# Patient Record
Sex: MALE | Race: WHITE | NOT HISPANIC OR LATINO | Employment: OTHER | URBAN - METROPOLITAN AREA
[De-identification: names, ages, dates, MRNs, and addresses within clinical notes are randomized per-mention and may not be internally consistent; named-entity substitution may affect disease eponyms.]

---

## 2023-05-30 DIAGNOSIS — F41.1 ANXIETY, GENERALIZED: Primary | ICD-10-CM

## 2023-05-30 PROBLEM — R09.81 NASAL CONGESTION: Status: ACTIVE | Noted: 2023-05-30

## 2023-05-30 PROBLEM — R09.89 CHEST CONGESTION: Status: ACTIVE | Noted: 2023-05-30

## 2023-05-30 PROBLEM — J34.2 NASAL SEPTAL DEVIATION: Status: ACTIVE | Noted: 2023-05-30

## 2023-05-30 PROBLEM — J18.0 BRONCHOPNEUMONIA: Status: ACTIVE | Noted: 2023-05-30

## 2023-05-30 PROBLEM — E03.9 ADULT HYPOTHYROIDISM: Status: ACTIVE | Noted: 2023-05-30

## 2023-05-30 PROBLEM — H65.491 CHRONIC OTITIS MEDIA OF RIGHT EAR WITH EFFUSION: Status: ACTIVE | Noted: 2023-05-30

## 2023-05-30 PROBLEM — E11.9 TYPE 2 DIABETES MELLITUS (MULTI): Status: ACTIVE | Noted: 2023-05-30

## 2023-05-30 PROBLEM — R73.09 ABNORMAL GLUCOSE: Status: ACTIVE | Noted: 2023-05-30

## 2023-05-30 PROBLEM — J34.89 NASAL DRAINAGE: Status: ACTIVE | Noted: 2023-05-30

## 2023-05-30 PROBLEM — K86.1 CHRONIC PANCREATITIS (MULTI): Status: ACTIVE | Noted: 2023-05-30

## 2023-05-30 PROBLEM — R06.2 WHEEZE: Status: ACTIVE | Noted: 2023-05-30

## 2023-05-30 PROBLEM — R53.83 FATIGUE: Status: ACTIVE | Noted: 2023-05-30

## 2023-05-30 PROBLEM — K86.89 PANCREATIC MASS (HHS-HCC): Status: ACTIVE | Noted: 2023-05-30

## 2023-05-30 PROBLEM — H90.A22 SENSORINEURAL HEARING LOSS (SNHL) OF LEFT EAR WITH RESTRICTED HEARING OF RIGHT EAR: Status: ACTIVE | Noted: 2023-05-30

## 2023-05-30 PROBLEM — H90.A31 MIXED CONDUCTIVE AND SENSORINEURAL HEARING LOSS, UNILATERAL, RIGHT EAR WITH RESTRICTED HEARING ON THE CONTRALATERAL SIDE: Status: ACTIVE | Noted: 2023-05-30

## 2023-05-30 PROBLEM — E55.9 VITAMIN D DEFICIENCY: Status: ACTIVE | Noted: 2023-05-30

## 2023-05-30 PROBLEM — R05.3 CHRONIC COUGH: Status: ACTIVE | Noted: 2023-05-30

## 2023-05-30 PROBLEM — H26.9 CATARACT: Status: ACTIVE | Noted: 2023-05-30

## 2023-05-30 PROBLEM — E53.8 VITAMIN B 12 DEFICIENCY: Status: ACTIVE | Noted: 2023-05-30

## 2023-05-30 PROBLEM — R10.9 ABDOMINAL PAIN: Status: ACTIVE | Noted: 2023-05-30

## 2023-05-30 PROBLEM — J34.3 HYPERTROPHY OF BOTH INFERIOR NASAL TURBINATES: Status: ACTIVE | Noted: 2023-05-30

## 2023-05-30 PROBLEM — H93.8X1 SENSATION OF PLUGGED EAR ON RIGHT SIDE: Status: ACTIVE | Noted: 2023-05-30

## 2023-05-30 PROBLEM — I25.10 CORONARY ARTERIOSCLEROSIS: Status: ACTIVE | Noted: 2023-05-30

## 2023-05-30 PROBLEM — C25.1: Status: ACTIVE | Noted: 2023-05-30

## 2023-05-30 PROBLEM — R53.82 CHRONIC FATIGUE: Status: ACTIVE | Noted: 2023-05-30

## 2023-05-30 PROBLEM — R91.8 PULMONARY NODULES: Status: ACTIVE | Noted: 2023-05-30

## 2023-05-30 PROBLEM — J31.0 CHRONIC RHINITIS: Status: ACTIVE | Noted: 2023-05-30

## 2023-05-30 PROBLEM — R94.128 ABNORMAL TYMPANOGRAM: Status: ACTIVE | Noted: 2023-05-30

## 2023-05-30 PROBLEM — E78.00 HYPERCHOLESTEROLEMIA: Status: ACTIVE | Noted: 2023-05-30

## 2023-05-30 PROBLEM — I10 BENIGN HYPERTENSION: Status: ACTIVE | Noted: 2023-05-30

## 2023-05-30 PROBLEM — R09.81 SINUS CONGESTION: Status: ACTIVE | Noted: 2023-05-30

## 2023-05-30 PROBLEM — B37.0 CANDIDIASIS OF MOUTH: Status: ACTIVE | Noted: 2023-05-30

## 2023-05-30 PROBLEM — K21.9 GERD (GASTROESOPHAGEAL REFLUX DISEASE): Status: ACTIVE | Noted: 2023-05-30

## 2023-05-30 PROBLEM — J20.9 BRONCHOSPASM WITH BRONCHITIS, ACUTE: Status: ACTIVE | Noted: 2023-05-30

## 2023-05-30 PROBLEM — R07.9 CHEST PAIN: Status: ACTIVE | Noted: 2023-05-30

## 2023-05-30 PROBLEM — R91.8 PULMONARY INFILTRATES ON CXR: Status: ACTIVE | Noted: 2023-05-30

## 2023-05-30 PROBLEM — K29.00 ACUTE GASTRITIS: Status: ACTIVE | Noted: 2023-05-30

## 2023-05-30 PROBLEM — H65.91 FLUID LEVEL BEHIND TYMPANIC MEMBRANE OF RIGHT EAR: Status: ACTIVE | Noted: 2023-05-30

## 2023-05-30 RX ORDER — BLOOD-GLUCOSE METER
EACH MISCELLANEOUS
COMMUNITY
Start: 2022-10-11

## 2023-05-30 RX ORDER — PREGABALIN 150 MG/1
150 CAPSULE ORAL
COMMUNITY
Start: 2022-08-03 | End: 2023-10-16 | Stop reason: ALTCHOICE

## 2023-05-30 RX ORDER — NITROGLYCERIN 0.4 MG/1
TABLET SUBLINGUAL
COMMUNITY
Start: 2022-06-06

## 2023-05-30 RX ORDER — METHYLPREDNISOLONE 4 MG/1
TABLET ORAL
COMMUNITY
Start: 2022-07-31 | End: 2023-10-09

## 2023-05-30 RX ORDER — OMEGA-3-ACID ETHYL ESTERS 1 G/1
1000 CAPSULE, LIQUID FILLED ORAL 2 TIMES DAILY
COMMUNITY
Start: 2022-10-11

## 2023-05-30 RX ORDER — ERGOCALCIFEROL 1.25 MG/1
50000 CAPSULE ORAL
COMMUNITY
Start: 2022-10-11

## 2023-05-30 RX ORDER — DULOXETIN HYDROCHLORIDE 30 MG/1
CAPSULE, DELAYED RELEASE ORAL
Qty: 90 CAPSULE | Refills: 3 | Status: SHIPPED | OUTPATIENT
Start: 2023-05-30 | End: 2023-10-16 | Stop reason: ALTCHOICE

## 2023-05-30 RX ORDER — EZETIMIBE 10 MG/1
10 TABLET ORAL DAILY
COMMUNITY
Start: 2019-07-02

## 2023-05-30 RX ORDER — LANCETS
EACH MISCELLANEOUS
COMMUNITY
Start: 2023-01-10

## 2023-05-30 RX ORDER — DEXTROMETHORPHAN HYDROBROMIDE, GUAIFENESIN 30; 600 MG/1; MG/1
1 TABLET, EXTENDED RELEASE ORAL EVERY 12 HOURS
COMMUNITY
Start: 2022-08-23 | End: 2023-10-09

## 2023-05-30 RX ORDER — LOSARTAN POTASSIUM 25 MG/1
25 TABLET ORAL DAILY
COMMUNITY
Start: 2020-04-13

## 2023-05-30 RX ORDER — BLOOD SUGAR DIAGNOSTIC
STRIP MISCELLANEOUS
COMMUNITY
Start: 2022-11-14

## 2023-05-30 RX ORDER — FLUTICASONE PROPIONATE AND SALMETEROL 250; 50 UG/1; UG/1
1 POWDER RESPIRATORY (INHALATION) 2 TIMES DAILY
COMMUNITY
Start: 2022-09-29 | End: 2023-10-09

## 2023-05-30 RX ORDER — LANCETS
EACH MISCELLANEOUS
COMMUNITY
Start: 2023-01-10 | End: 2023-10-16 | Stop reason: ALTCHOICE

## 2023-05-30 RX ORDER — METHOCARBAMOL 500 MG/1
TABLET, FILM COATED ORAL
COMMUNITY
Start: 2022-07-31 | End: 2023-10-09

## 2023-05-30 RX ORDER — OXYCODONE AND ACETAMINOPHEN 5; 325 MG/1; MG/1
1 TABLET ORAL DAILY PRN
COMMUNITY

## 2023-05-30 RX ORDER — ATORVASTATIN CALCIUM 80 MG/1
80 TABLET, FILM COATED ORAL
COMMUNITY
Start: 2020-06-22 | End: 2023-10-24

## 2023-05-30 RX ORDER — MOXIFLOXACIN 5 MG/ML
SOLUTION/ DROPS OPHTHALMIC 4 TIMES DAILY
COMMUNITY
End: 2023-10-09

## 2023-05-30 RX ORDER — LEVOTHYROXINE SODIUM 125 UG/1
1 TABLET ORAL DAILY
COMMUNITY
Start: 2017-12-09 | End: 2023-10-09

## 2023-05-30 RX ORDER — INHALER, ASSIST DEVICES
SPACER (EA) MISCELLANEOUS
COMMUNITY
Start: 2022-08-09 | End: 2023-10-09

## 2023-05-30 RX ORDER — IBUPROFEN 200 MG
1 TABLET ORAL DAILY
COMMUNITY
Start: 2023-03-02 | End: 2023-10-09

## 2023-05-30 RX ORDER — GABAPENTIN 100 MG/1
100 CAPSULE ORAL 3 TIMES DAILY
COMMUNITY
Start: 2022-06-07 | End: 2023-10-09

## 2023-05-30 RX ORDER — PREDNISOLONE ACETATE 10 MG/ML
SUSPENSION/ DROPS OPHTHALMIC 4 TIMES DAILY
COMMUNITY
End: 2023-10-09

## 2023-05-30 RX ORDER — BENZONATATE 100 MG/1
1 CAPSULE ORAL EVERY 8 HOURS PRN
COMMUNITY
Start: 2022-08-09 | End: 2023-10-09

## 2023-05-30 RX ORDER — AMOXICILLIN 500 MG/1
CAPSULE ORAL
COMMUNITY
Start: 2023-05-02 | End: 2023-10-09

## 2023-05-30 RX ORDER — LEVOFLOXACIN 500 MG/1
TABLET, FILM COATED ORAL
COMMUNITY
Start: 2022-08-16 | End: 2023-10-09

## 2023-05-30 RX ORDER — INSULIN GLARGINE 100 [IU]/ML
26 INJECTION, SOLUTION SUBCUTANEOUS EVERY MORNING
COMMUNITY

## 2023-05-30 RX ORDER — AMOXICILLIN AND CLAVULANATE POTASSIUM 875; 125 MG/1; MG/1
1 TABLET, FILM COATED ORAL EVERY 12 HOURS
COMMUNITY
Start: 2022-08-04 | End: 2023-10-09

## 2023-05-30 RX ORDER — DULOXETIN HYDROCHLORIDE 30 MG/1
30 CAPSULE, DELAYED RELEASE ORAL DAILY
COMMUNITY
Start: 2016-10-25 | End: 2024-06-08

## 2023-05-30 RX ORDER — ALBUTEROL SULFATE 90 UG/1
AEROSOL, METERED RESPIRATORY (INHALATION)
COMMUNITY
Start: 2022-08-09 | End: 2023-10-09

## 2023-05-30 RX ORDER — AZITHROMYCIN 250 MG/1
TABLET, FILM COATED ORAL
COMMUNITY
Start: 2022-08-09 | End: 2023-10-09

## 2023-05-30 RX ORDER — NICOTINE 10 MG/ML
SPRAY, METERED NASAL
COMMUNITY
Start: 2023-03-02 | End: 2023-10-09

## 2023-05-30 RX ORDER — ATENOLOL 25 MG/1
25 TABLET ORAL
COMMUNITY
Start: 2019-07-02

## 2023-05-30 RX ORDER — CIPROFLOXACIN HYDROCHLORIDE 3 MG/ML
SOLUTION/ DROPS OPHTHALMIC
COMMUNITY
Start: 2023-04-14 | End: 2023-10-16 | Stop reason: ALTCHOICE

## 2023-05-30 RX ORDER — ASPIRIN 81 MG/1
1 TABLET ORAL DAILY
COMMUNITY

## 2023-05-30 RX ORDER — CLOTRIMAZOLE 10 MG/1
LOZENGE ORAL; TOPICAL
COMMUNITY
Start: 2022-08-19 | End: 2023-10-09

## 2023-05-30 RX ORDER — PEN NEEDLE, DIABETIC 31 GX5/16"
NEEDLE, DISPOSABLE MISCELLANEOUS
COMMUNITY
Start: 2023-01-09

## 2023-05-30 RX ORDER — OMEPRAZOLE 20 MG/1
20 CAPSULE, DELAYED RELEASE ORAL DAILY
COMMUNITY
Start: 2023-01-24 | End: 2023-10-24

## 2023-05-30 RX ORDER — PREDNISONE 20 MG/1
TABLET ORAL
COMMUNITY
Start: 2022-09-29 | End: 2023-10-09

## 2023-05-30 RX ORDER — FLUTICASONE PROPIONATE 50 MCG
2 SPRAY, SUSPENSION (ML) NASAL 2 TIMES DAILY
COMMUNITY
End: 2023-10-09

## 2023-08-18 PROBLEM — Z72.0 TOBACCO ABUSE: Status: ACTIVE | Noted: 2023-08-18

## 2023-10-02 ENCOUNTER — HOSPITAL ENCOUNTER (OUTPATIENT)
Dept: RADIOLOGY | Facility: CLINIC | Age: 63
Discharge: HOME | End: 2023-10-02
Payer: MEDICARE

## 2023-10-02 DIAGNOSIS — M25.511 PAIN IN RIGHT SHOULDER: ICD-10-CM

## 2023-10-02 PROCEDURE — 73200 CT UPPER EXTREMITY W/O DYE: CPT | Mod: RIGHT SIDE | Performed by: RADIOLOGY

## 2023-10-02 PROCEDURE — 73200 CT UPPER EXTREMITY W/O DYE: CPT | Mod: RT

## 2023-10-09 DIAGNOSIS — E03.9 ADULT HYPOTHYROIDISM: Primary | ICD-10-CM

## 2023-10-09 PROBLEM — H65.91 FLUID LEVEL BEHIND TYMPANIC MEMBRANE OF RIGHT EAR: Status: RESOLVED | Noted: 2023-05-30 | Resolved: 2023-10-09

## 2023-10-09 PROBLEM — C25.1: Status: RESOLVED | Noted: 2023-05-30 | Resolved: 2023-10-09

## 2023-10-09 PROBLEM — M75.41 IMPINGEMENT SYNDROME OF RIGHT SHOULDER: Status: ACTIVE | Noted: 2023-05-28

## 2023-10-09 PROBLEM — B37.0 CANDIDIASIS OF MOUTH: Status: RESOLVED | Noted: 2023-05-30 | Resolved: 2023-10-09

## 2023-10-09 PROBLEM — M75.121 COMPLETE TEAR OF RIGHT ROTATOR CUFF: Status: ACTIVE | Noted: 2023-08-28

## 2023-10-09 PROBLEM — M47.9 SPONDYLOSIS: Status: ACTIVE | Noted: 2023-10-09

## 2023-10-09 PROBLEM — F41.1 ANXIETY, GENERALIZED: Status: RESOLVED | Noted: 2023-05-30 | Resolved: 2023-10-09

## 2023-10-09 PROBLEM — M17.12 ARTHRITIS OF LEFT KNEE: Status: ACTIVE | Noted: 2023-08-28

## 2023-10-09 PROBLEM — R94.128 ABNORMAL TYMPANOGRAM: Status: RESOLVED | Noted: 2023-05-30 | Resolved: 2023-10-09

## 2023-10-09 PROBLEM — M48.00 SPINAL STENOSIS: Status: ACTIVE | Noted: 2023-10-09

## 2023-10-09 PROBLEM — H26.9 CATARACT: Status: RESOLVED | Noted: 2023-05-30 | Resolved: 2023-10-09

## 2023-10-09 PROBLEM — R25.2 CRAMPING OF HANDS: Status: ACTIVE | Noted: 2023-05-28

## 2023-10-09 RX ORDER — LEVOTHYROXINE SODIUM 125 UG/1
125 TABLET ORAL DAILY
Qty: 90 TABLET | Refills: 3 | Status: SHIPPED | OUTPATIENT
Start: 2023-10-09

## 2023-10-09 RX ORDER — AMMONIUM LACTATE 12 G/100G
LOTION TOPICAL
COMMUNITY
Start: 2023-08-28

## 2023-10-09 RX ORDER — KETOCONAZOLE 20 MG/G
CREAM TOPICAL
COMMUNITY
Start: 2023-08-28

## 2023-10-09 RX ORDER — PANTOPRAZOLE SODIUM 40 MG/1
40 TABLET, DELAYED RELEASE ORAL
COMMUNITY
Start: 2021-09-22 | End: 2023-10-16 | Stop reason: ALTCHOICE

## 2023-10-10 ENCOUNTER — TELEPHONE (OUTPATIENT)
Dept: CARDIOLOGY | Facility: CLINIC | Age: 63
End: 2023-10-10
Payer: MEDICARE

## 2023-10-10 NOTE — TELEPHONE ENCOUNTER
Clearance request received by surgeon's office and Rober has to complete. Call pt and informed him Rober will complete and we will fax. Once this has been done we will let him know. Pt verbalizes understanding.

## 2023-10-16 ENCOUNTER — OFFICE VISIT (OUTPATIENT)
Dept: PRIMARY CARE | Facility: CLINIC | Age: 63
End: 2023-10-16
Payer: MEDICARE

## 2023-10-16 VITALS
DIASTOLIC BLOOD PRESSURE: 70 MMHG | BODY MASS INDEX: 32.17 KG/M2 | SYSTOLIC BLOOD PRESSURE: 120 MMHG | WEIGHT: 211.6 LBS | OXYGEN SATURATION: 98 % | HEART RATE: 53 BPM

## 2023-10-16 DIAGNOSIS — Z01.818 PREOPERATIVE CLEARANCE: Primary | ICD-10-CM

## 2023-10-16 DIAGNOSIS — E11.69 TYPE 2 DIABETES MELLITUS WITH OTHER SPECIFIED COMPLICATION, UNSPECIFIED WHETHER LONG TERM INSULIN USE (MULTI): ICD-10-CM

## 2023-10-16 PROCEDURE — 3074F SYST BP LT 130 MM HG: CPT | Performed by: STUDENT IN AN ORGANIZED HEALTH CARE EDUCATION/TRAINING PROGRAM

## 2023-10-16 PROCEDURE — 4010F ACE/ARB THERAPY RXD/TAKEN: CPT | Performed by: STUDENT IN AN ORGANIZED HEALTH CARE EDUCATION/TRAINING PROGRAM

## 2023-10-16 PROCEDURE — 3078F DIAST BP <80 MM HG: CPT | Performed by: STUDENT IN AN ORGANIZED HEALTH CARE EDUCATION/TRAINING PROGRAM

## 2023-10-16 PROCEDURE — 99213 OFFICE O/P EST LOW 20 MIN: CPT | Performed by: STUDENT IN AN ORGANIZED HEALTH CARE EDUCATION/TRAINING PROGRAM

## 2023-10-16 ASSESSMENT — ENCOUNTER SYMPTOMS: DEPRESSION: 0

## 2023-10-16 ASSESSMENT — PAIN SCALES - GENERAL: PAINLEVEL: 8

## 2023-10-16 NOTE — PROGRESS NOTES
Subjective   Patient ID: Harry Ochoa is a 63 y.o. male who presents for Surgery clearance (Right shoulder replacement.).    HPI comes in for surgical clearance    Review of Systems  Constitutional: NO F, chills, or sweats  Eyes: no blurred vision or visual disturbance  ENT: no hearing loss, no congestion, no nasal discharge, no hoarseness and no sore throat.   Cardiovascular: no chest pain, no edema, no palps and no syncope.   Respiratory: no cough,no s.o.b. and no wheezing  Gastrointestinal: no abdominal pain, No C/D no N/V, no blood in stools  Genitourinary: no dysuria, no change in urinary frequency, no urinary hesitancy and no feelings of urinary urgency.   Musculoskeletal: Chronic joint pains, chronic right shoulder pain  Integumentary: no new skin lesions and no rashes.   Neurological: no difficulty walking, no headache, no limb weakness, no numbness and no tingling.   Psychiatric: no anxiety, no depression, no anhedonia and no substance use disorders.   Endocrine: no recent weight gain and no recent weight loss.  Diabetes has been well controlled with endocrinology  Hematologic/Lymphatic: no tendency for easy bruising and no swollen glands.  Objective   /70 (BP Location: Right arm, Patient Position: Sitting, BP Cuff Size: Large adult)   Pulse 53   Wt 96 kg (211 lb 9.6 oz)   SpO2 98%   BMI 32.17 kg/m²     Physical Exam  gen- a & o x 3, nad, pleasant  heent- eomi, perrla, ear canals patent, TM's non-erythematous, no fluid, frontal and maxillary sinus's nontender  neck- supple, nontender, no palpable or enlarged nodes, no thyromegaly  heart- rrr, no murmurs  lungs- cta b/l , no w/r/r  chest- symmetric, nontender  ab- soft, nontender, no palpable organomegaly, postive bowel sounds  ex's- no c/c/e  neuro- CNs 2-12 grossly intact, full sensation and strength in all extremities    Assessment/Plan     #1.  Chronic right shoulder pain.  Pending orthopedic surgery.  Surgical clearance visit today.  Letter  written today.

## 2023-10-16 NOTE — LETTER
Attention orthopedics,    Harry Ochoa (1960) was seen today for preoperative clearance visit for right shoulder total replacement.  He has already been seen by his cardiologist and endocrinologist and received surgical clearance.  Today he has no signs of infectious disease.  He has had no prior complications to anesthesia with surgeries.  He is up-to-date with screenings.    At this time he is cleared for orthopedic right shoulder replacement without restriction.    Thank you for your cooperation.    Rinku Matamoros DO

## 2023-10-17 ENCOUNTER — TELEPHONE (OUTPATIENT)
Dept: CARDIOLOGY | Facility: CLINIC | Age: 63
End: 2023-10-17
Payer: MEDICARE

## 2023-10-17 NOTE — TELEPHONE ENCOUNTER
Received faxed cardiac clearance. Pt to have right reverse total shoulder surgery with Dr. Hernandez, not scheduled yet. Pt is taking a BASA.    Please advise of cardiac risk and how long to hold ASA.

## 2023-10-19 DIAGNOSIS — E78.00 HYPERCHOLESTEROLEMIA: Primary | ICD-10-CM

## 2023-10-24 DIAGNOSIS — K21.9 GASTROESOPHAGEAL REFLUX DISEASE, UNSPECIFIED WHETHER ESOPHAGITIS PRESENT: Primary | ICD-10-CM

## 2023-10-24 RX ORDER — OMEPRAZOLE 20 MG/1
20 CAPSULE, DELAYED RELEASE ORAL
Qty: 60 CAPSULE | Refills: 5 | Status: SHIPPED | OUTPATIENT
Start: 2023-10-24

## 2023-10-24 RX ORDER — ATORVASTATIN CALCIUM 80 MG/1
80 TABLET, FILM COATED ORAL DAILY
Qty: 90 TABLET | Refills: 3 | Status: SHIPPED | OUTPATIENT
Start: 2023-10-24

## 2023-10-27 ENCOUNTER — APPOINTMENT (OUTPATIENT)
Dept: OTOLARYNGOLOGY | Facility: CLINIC | Age: 63
End: 2023-10-27
Payer: MEDICARE

## 2023-10-27 ENCOUNTER — APPOINTMENT (OUTPATIENT)
Dept: AUDIOLOGY | Facility: CLINIC | Age: 63
End: 2023-10-27
Payer: MEDICARE

## 2024-03-22 ENCOUNTER — NURSE TRIAGE (OUTPATIENT)
Dept: PRIMARY CARE | Facility: CLINIC | Age: 64
End: 2024-03-22
Payer: MEDICARE

## 2024-03-29 ENCOUNTER — OFFICE VISIT (OUTPATIENT)
Dept: PRIMARY CARE | Facility: CLINIC | Age: 64
End: 2024-03-29
Payer: MEDICARE

## 2024-03-29 VITALS
DIASTOLIC BLOOD PRESSURE: 70 MMHG | HEART RATE: 65 BPM | OXYGEN SATURATION: 98 % | BODY MASS INDEX: 31.81 KG/M2 | WEIGHT: 209.2 LBS | SYSTOLIC BLOOD PRESSURE: 130 MMHG

## 2024-03-29 DIAGNOSIS — K86.1 CHRONIC PANCREATITIS, UNSPECIFIED PANCREATITIS TYPE (MULTI): ICD-10-CM

## 2024-03-29 DIAGNOSIS — Z72.0 TOBACCO ABUSE: ICD-10-CM

## 2024-03-29 DIAGNOSIS — R91.8 MULTIPLE LUNG NODULES: ICD-10-CM

## 2024-03-29 DIAGNOSIS — I10 BENIGN HYPERTENSION: ICD-10-CM

## 2024-03-29 DIAGNOSIS — Z00.00 WELLNESS EXAMINATION: Primary | ICD-10-CM

## 2024-03-29 PROBLEM — M25.511 RIGHT SHOULDER PAIN: Status: ACTIVE | Noted: 2023-11-07

## 2024-03-29 PROBLEM — H90.8 MIXED CONDUCTIVE AND SENSORINEURAL HEARING LOSS: Status: ACTIVE | Noted: 2023-05-30

## 2024-03-29 PROBLEM — H91.93 BILATERAL HEARING LOSS: Status: ACTIVE | Noted: 2023-05-30

## 2024-03-29 PROBLEM — E66.3 OVERWEIGHT WITH BODY MASS INDEX (BMI) 25.0-29.9: Status: ACTIVE | Noted: 2024-03-29

## 2024-03-29 PROBLEM — M19.019 ARTHROPATHY OF SHOULDER REGION: Status: ACTIVE | Noted: 2023-11-07

## 2024-03-29 PROBLEM — J18.9 PNEUMONIA: Status: ACTIVE | Noted: 2023-05-30

## 2024-03-29 PROBLEM — F10.929 ALCOHOLIC INTOXICATION (CMS-HCC): Status: ACTIVE | Noted: 2024-03-29

## 2024-03-29 PROBLEM — R11.2 NAUSEA AND VOMITING: Status: ACTIVE | Noted: 2024-03-29

## 2024-03-29 PROBLEM — G54.1 LUMBOSACRAL PLEXUS LESION: Status: ACTIVE | Noted: 2019-06-01

## 2024-03-29 PROBLEM — I25.10 ARTERIOSCLEROSIS OF CORONARY ARTERY: Status: ACTIVE | Noted: 2023-01-06

## 2024-03-29 PROBLEM — Z96.611 S/P REVERSE TOTAL SHOULDER ARTHROPLASTY, RIGHT: Status: ACTIVE | Noted: 2023-12-18

## 2024-03-29 PROBLEM — Z86.39 HISTORY OF HYPOTHYROIDISM: Status: ACTIVE | Noted: 2024-03-29

## 2024-03-29 PROBLEM — M75.120 COMPLETE TEAR OF ROTATOR CUFF: Status: ACTIVE | Noted: 2023-08-28

## 2024-03-29 PROBLEM — H65.31 CHRONIC MUCOID OTITIS MEDIA OF RIGHT EAR: Status: ACTIVE | Noted: 2023-05-30

## 2024-03-29 PROBLEM — E03.9 HYPOTHYROIDISM: Status: ACTIVE | Noted: 2023-01-06

## 2024-03-29 PROCEDURE — G0439 PPPS, SUBSEQ VISIT: HCPCS | Performed by: STUDENT IN AN ORGANIZED HEALTH CARE EDUCATION/TRAINING PROGRAM

## 2024-03-29 PROCEDURE — 3078F DIAST BP <80 MM HG: CPT | Performed by: STUDENT IN AN ORGANIZED HEALTH CARE EDUCATION/TRAINING PROGRAM

## 2024-03-29 PROCEDURE — 3075F SYST BP GE 130 - 139MM HG: CPT | Performed by: STUDENT IN AN ORGANIZED HEALTH CARE EDUCATION/TRAINING PROGRAM

## 2024-03-29 PROCEDURE — 4010F ACE/ARB THERAPY RXD/TAKEN: CPT | Performed by: STUDENT IN AN ORGANIZED HEALTH CARE EDUCATION/TRAINING PROGRAM

## 2024-03-29 PROCEDURE — 99214 OFFICE O/P EST MOD 30 MIN: CPT | Performed by: STUDENT IN AN ORGANIZED HEALTH CARE EDUCATION/TRAINING PROGRAM

## 2024-03-29 ASSESSMENT — PAIN SCALES - GENERAL: PAINLEVEL: 7

## 2024-03-29 ASSESSMENT — ENCOUNTER SYMPTOMS: DEPRESSION: 0

## 2024-03-29 NOTE — PATIENT INSTRUCTIONS
1.  Medicare wellness.  No concerns on exam.  He is up-to-date with screening labs he has multiple providers that he sees including endocrinology.    2.  History of pancreatitis, he has remained alcohol free.  There was an abnormal finding on pancreas, he did see a specialist about 1 year ago it was advised on a yearly follow-up MRI.  Referral back to GI I would like you to discuss routine surveillance of the pancreas, also you will likely need a colonoscopy.    3.  Still smoking.  History of pulmonary nodules.  He had a scan at the end of 2022, repeat scan ordered today for yearly follow-up for pulmonary nodules.    4.  Continue routine follow-up with cardiology and endocrinology.    Rinku Matamoros DO  for questions and f/u please call office   [place] [phone] [facility] [phone]  any forms needed please fax   [place] [phone] [facility] [phone]  for Physical therapy orders can call [phone]  for Radiology orders can call [phone]  for general referrals can call [phone]  for cardiac testing can call [phone]  for GI testing call [phone]

## 2024-03-29 NOTE — PROGRESS NOTES
Subjective   Patient ID: Harry Ochoa is a 63 y.o. male who presents for Medicare Annual Wellness Visit Subsequent (He is fasting.).    HPI comes in for Medicare wellness    Review of Systems  Constitutional: NO F, chills, or sweats  Eyes: no blurred vision or visual disturbance  ENT: no hearing loss, no congestion, no nasal discharge, no hoarseness and no sore throat.   Cardiovascular: no chest pain, no edema, no palps and no syncope.   Respiratory: no cough,no s.o.b. and no wheezing  Gastrointestinal: no abdominal pain, No C/D no N/V, no blood in stools  Genitourinary: no dysuria, no change in urinary frequency, no urinary hesitancy and no feelings of urinary urgency.   Musculoskeletal: no arthralgias,  no back pain and no myalgias.   Integumentary: no new skin lesions and no rashes.   Neurological: no difficulty walking, no headache, no limb weakness, no numbness and no tingling.   Psychiatric: no anxiety, no depression, no anhedonia and no substance use disorders.   Endocrine: no recent weight gain and no recent weight loss.   Hematologic/Lymphatic: no tendency for easy bruising and no swollen glands.  Objective   /70 (BP Location: Right arm, Patient Position: Sitting, BP Cuff Size: Adult)   Pulse 65   Wt 94.9 kg (209 lb 3.2 oz)   SpO2 98%   BMI 31.81 kg/m²     Physical Exam  gen- a & o x 3, nad, pleasant  heent- eomi, perrla, ear canals patent, TM's non-erythematous, no fluid, frontal and maxillary sinus's nontender  neck- supple, nontender, no palpable or enlarged nodes, no thyromegaly  heart- rrr, no murmurs  lungs- cta b/l , no w/r/r  chest- symmetric, nontender  ab- soft, nontender, no palpable organomegaly, postive bowel sounds  ex's- no c/c/e  neuro- CNs 2-12 grossly intact, full sensation and strength in all extremities    Assessment/Plan     1.  Medicare wellness.  No concerns on exam.  He is up-to-date with screening labs he has multiple providers that he sees including endocrinology.    2.   History of pancreatitis, he has remained alcohol free.  There was an abnormal finding on pancreas, he did see a specialist about 1 year ago it was advised on a yearly follow-up MRI.  Referral back to GI I would like you to discuss routine surveillance of the pancreas, also you will likely need a colonoscopy.    3.  Still smoking.  History of pulmonary nodules.  He had a scan at the end of 2022, repeat scan ordered today for yearly follow-up for pulmonary nodules.    4.  Continue routine follow-up with cardiology and endocrinology.

## 2024-04-02 ASSESSMENT — PATIENT HEALTH QUESTIONNAIRE - PHQ9
SUM OF ALL RESPONSES TO PHQ9 QUESTIONS 1 AND 2: 0
2. FEELING DOWN, DEPRESSED OR HOPELESS: NOT AT ALL
1. LITTLE INTEREST OR PLEASURE IN DOING THINGS: NOT AT ALL

## 2024-04-02 ASSESSMENT — ACTIVITIES OF DAILY LIVING (ADL)
GROCERY_SHOPPING: INDEPENDENT
MANAGING_FINANCES: INDEPENDENT
BATHING: INDEPENDENT
DOING_HOUSEWORK: INDEPENDENT
TAKING_MEDICATION: INDEPENDENT
DRESSING: INDEPENDENT

## 2024-04-02 ASSESSMENT — ENCOUNTER SYMPTOMS
LOSS OF SENSATION IN FEET: 0
OCCASIONAL FEELINGS OF UNSTEADINESS: 0

## 2024-04-19 ENCOUNTER — HOSPITAL ENCOUNTER (OUTPATIENT)
Dept: RADIOLOGY | Facility: CLINIC | Age: 64
Discharge: HOME | End: 2024-04-19
Payer: MEDICARE

## 2024-04-19 DIAGNOSIS — Z72.0 TOBACCO ABUSE: ICD-10-CM

## 2024-04-19 DIAGNOSIS — R91.8 MULTIPLE LUNG NODULES: ICD-10-CM

## 2024-04-19 PROCEDURE — 71250 CT THORAX DX C-: CPT | Performed by: RADIOLOGY

## 2024-04-19 PROCEDURE — 71250 CT THORAX DX C-: CPT

## 2024-06-08 DIAGNOSIS — G54.1 LUMBOSACRAL PLEXUS LESION: Primary | ICD-10-CM

## 2024-06-08 RX ORDER — DULOXETIN HYDROCHLORIDE 30 MG/1
30 CAPSULE, DELAYED RELEASE ORAL DAILY
Qty: 90 CAPSULE | Refills: 3 | Status: SHIPPED | OUTPATIENT
Start: 2024-06-08 | End: 2025-06-08

## 2024-06-08 RX ORDER — AMOXICILLIN 500 MG/1
CAPSULE ORAL
COMMUNITY
Start: 2024-05-14

## 2024-06-08 RX ORDER — TRAMADOL HYDROCHLORIDE 50 MG/1
TABLET ORAL
COMMUNITY
Start: 2024-05-16

## 2024-07-02 ENCOUNTER — LAB (OUTPATIENT)
Dept: LAB | Facility: LAB | Age: 64
End: 2024-07-02
Payer: MEDICARE

## 2024-07-02 ENCOUNTER — APPOINTMENT (OUTPATIENT)
Dept: GASTROENTEROLOGY | Facility: CLINIC | Age: 64
End: 2024-07-02
Payer: MEDICARE

## 2024-07-02 DIAGNOSIS — E43 UNSPECIFIED SEVERE PROTEIN-CALORIE MALNUTRITION (MULTI): ICD-10-CM

## 2024-07-02 DIAGNOSIS — K86.0 ALCOHOL-INDUCED CHRONIC PANCREATITIS (MULTI): ICD-10-CM

## 2024-07-02 DIAGNOSIS — R97.8 OTHER ABNORMAL TUMOR MARKERS: ICD-10-CM

## 2024-07-02 DIAGNOSIS — Z12.11 COLON CANCER SCREENING: ICD-10-CM

## 2024-07-02 DIAGNOSIS — K86.0 ALCOHOL-INDUCED CHRONIC PANCREATITIS (MULTI): Primary | ICD-10-CM

## 2024-07-02 LAB
25(OH)D3 SERPL-MCNC: 62 NG/ML (ref 30–100)
CANCER AG19-9 SERPL-ACNC: 60.64 U/ML

## 2024-07-02 PROCEDURE — 36415 COLL VENOUS BLD VENIPUNCTURE: CPT

## 2024-07-02 PROCEDURE — 86301 IMMUNOASSAY TUMOR CA 19-9: CPT

## 2024-07-02 PROCEDURE — 99214 OFFICE O/P EST MOD 30 MIN: CPT | Performed by: STUDENT IN AN ORGANIZED HEALTH CARE EDUCATION/TRAINING PROGRAM

## 2024-07-02 PROCEDURE — 82306 VITAMIN D 25 HYDROXY: CPT

## 2024-07-02 PROCEDURE — 4010F ACE/ARB THERAPY RXD/TAKEN: CPT | Performed by: STUDENT IN AN ORGANIZED HEALTH CARE EDUCATION/TRAINING PROGRAM

## 2024-07-02 RX ORDER — SODIUM, POTASSIUM,MAG SULFATES 17.5-3.13G
SOLUTION, RECONSTITUTED, ORAL ORAL
Qty: 2 EACH | Refills: 0 | Status: SHIPPED | OUTPATIENT
Start: 2024-07-02

## 2024-07-02 NOTE — H&P (VIEW-ONLY)
"Subjective     History of Present Illness:   Harry Ochoa is a 63 y.o. male who presents to GI clinic for follow-up of chronic pancreatitis.  Since his last evaluation, he has had intermittent rare alcohol use.  He endorses new onset episodes of loose greasy stools, has had worsening diabetes, and has had a change in his abdominal pain.  He continues to smoke.      Past Medical History   has a past medical history of Other specified counseling, Overweight, Personal history of nicotine dependence (04/15/2019), Personal history of other diseases of the musculoskeletal system and connective tissue, Personal history of other diseases of the nervous system and sense organs, Personal history of other endocrine, nutritional and metabolic disease, and Personal history of other specified conditions.     Social History   reports that he has been smoking cigarettes. He has never used smokeless tobacco. He reports that he does not currently use alcohol. He reports that he does not use drugs.     Family History  family history includes Lymphoma in his father.     Allergies  Allergies   Allergen Reactions    Indomethacin Unknown and Confusion     Other Reaction(s): Confusion/Disorientation    Rosuvastatin Other and Hives    Shellfish Containing Products Unknown       Medications  Current Outpatient Medications   Medication Instructions    Accu-Chek Guide Glucose Meter misc TEST BLOOD SUGAR DAILY AS DIRECTED    Accu-Chek Guide test strips strip TESTS 3 TIMES PER DAY    Accu-Chek Softclix Lancets misc TEST BLOOD SUGAR EVERY DAY AS DIRECTED    ammonium lactate (Lac-Hydrin) 12 % lotion APPLY TO DRY SKIN ONCE A DAY AFTER SHOWERING    amoxicillin (Amoxil) 500 mg capsule TAKE 4 CAPSULES BY MOUTH 1 HOUR PRIOR TO DENTAL APPT    aspirin 81 mg EC tablet 1 tablet, oral, Daily    atenolol (TENORMIN) 25 mg    atorvastatin (LIPITOR) 80 mg, oral, Daily    BD Ultra-Fine Mini Pen Needle 31 gauge x 3/16\" needle     DULoxetine (CYMBALTA) 30 mg, " oral, Daily    ergocalciferol (Vitamin D-2) 1.25 MG (37812 UT) capsule 50,000 Int'l Units, oral, Every 14 days    ezetimibe (ZETIA) 10 mg, oral, Daily    ketoconazole (NIZOral) 2 % cream APPLY TWICE TO AFFECTED AREAS ON THE FACE WHEN FLARING    Lantus Solostar U-100 Insulin 100 unit/mL (3 mL) pen Inject 26 Units under the skin once daily in the morning.    levothyroxine (SYNTHROID, LEVOXYL) 125 mcg, oral, Daily    losartan (Cozaar) 25 mg tablet Take 1 tablet (25 mg) by mouth once daily.    nitroglycerin (Nitrostat) 0.4 mg SL tablet DISSOLVE 1 TABLET UNDER THE TONGUE AS NEEDED FOR CHEST PAIN. AS DIRECTED    omega-3 acid ethyl esters (LOVAZA) 1,000 mg, oral, 2 times daily    omeprazole (PRILOSEC) 20 mg, oral, Daily before breakfast    oxyCODONE-acetaminophen (Percocet) 5-325 mg tablet 1 tablet, oral, Daily PRN    traMADol (Ultram) 50 mg tablet TAKE 1-2 TABLETS BY MOUTH EVERY 6-8 HOURS AS NEEDED FOR PAIN        Objective   There were no vitals taken for this visit.   Physical Exam  Vitals reviewed.   Constitutional:       General: He is awake.   Pulmonary:      Effort: Pulmonary effort is normal.   Neurological:      Mental Status: He is alert and oriented to person, place, and time.   Psychiatric:         Attention and Perception: Attention and perception normal.         Behavior: Behavior normal.         Assessment/Plan   Harry Ochoa is a 63 y.o. male who presents to GI clinic for follow-up of chronic pancreatitis.  We discussed that his chronic pancreatitis is progressive disease, in the setting of worsening diabetes, change in his characteristic abdominal pain, loose stools, and prior elevated CA 19-9 he is at elevated risk of developing a pancreatic malignancy especially due to his ongoing smoking.  I would recommend that he undergo an MRI for further evaluation, as well as obtain further testing to evaluate for malabsorptive diseases and pancreatic insufficiency.  Will also obtain a repeat CA 19-9 considering  prior elevated levels, and based on these findings he may require repeat EUS.  He is also due for colorectal cancer screening.  This was not discussed with the patient, however the order has been placed.  Will have team follow-up with patient and get him scheduled for colonoscopy.  Ideally his MRI will be done prior to his colonoscopy and can be coordinated with an EUS if needed.          Kt Marks MD

## 2024-07-02 NOTE — PROGRESS NOTES
"Subjective     History of Present Illness:   Harry Ochoa is a 63 y.o. male who presents to GI clinic for follow-up of chronic pancreatitis.  Since his last evaluation, he has had intermittent rare alcohol use.  He endorses new onset episodes of loose greasy stools, has had worsening diabetes, and has had a change in his abdominal pain.  He continues to smoke.      Past Medical History   has a past medical history of Other specified counseling, Overweight, Personal history of nicotine dependence (04/15/2019), Personal history of other diseases of the musculoskeletal system and connective tissue, Personal history of other diseases of the nervous system and sense organs, Personal history of other endocrine, nutritional and metabolic disease, and Personal history of other specified conditions.     Social History   reports that he has been smoking cigarettes. He has never used smokeless tobacco. He reports that he does not currently use alcohol. He reports that he does not use drugs.     Family History  family history includes Lymphoma in his father.     Allergies  Allergies   Allergen Reactions    Indomethacin Unknown and Confusion     Other Reaction(s): Confusion/Disorientation    Rosuvastatin Other and Hives    Shellfish Containing Products Unknown       Medications  Current Outpatient Medications   Medication Instructions    Accu-Chek Guide Glucose Meter misc TEST BLOOD SUGAR DAILY AS DIRECTED    Accu-Chek Guide test strips strip TESTS 3 TIMES PER DAY    Accu-Chek Softclix Lancets misc TEST BLOOD SUGAR EVERY DAY AS DIRECTED    ammonium lactate (Lac-Hydrin) 12 % lotion APPLY TO DRY SKIN ONCE A DAY AFTER SHOWERING    amoxicillin (Amoxil) 500 mg capsule TAKE 4 CAPSULES BY MOUTH 1 HOUR PRIOR TO DENTAL APPT    aspirin 81 mg EC tablet 1 tablet, oral, Daily    atenolol (TENORMIN) 25 mg    atorvastatin (LIPITOR) 80 mg, oral, Daily    BD Ultra-Fine Mini Pen Needle 31 gauge x 3/16\" needle     DULoxetine (CYMBALTA) 30 mg, " oral, Daily    ergocalciferol (Vitamin D-2) 1.25 MG (98091 UT) capsule 50,000 Int'l Units, oral, Every 14 days    ezetimibe (ZETIA) 10 mg, oral, Daily    ketoconazole (NIZOral) 2 % cream APPLY TWICE TO AFFECTED AREAS ON THE FACE WHEN FLARING    Lantus Solostar U-100 Insulin 100 unit/mL (3 mL) pen Inject 26 Units under the skin once daily in the morning.    levothyroxine (SYNTHROID, LEVOXYL) 125 mcg, oral, Daily    losartan (Cozaar) 25 mg tablet Take 1 tablet (25 mg) by mouth once daily.    nitroglycerin (Nitrostat) 0.4 mg SL tablet DISSOLVE 1 TABLET UNDER THE TONGUE AS NEEDED FOR CHEST PAIN. AS DIRECTED    omega-3 acid ethyl esters (LOVAZA) 1,000 mg, oral, 2 times daily    omeprazole (PRILOSEC) 20 mg, oral, Daily before breakfast    oxyCODONE-acetaminophen (Percocet) 5-325 mg tablet 1 tablet, oral, Daily PRN    traMADol (Ultram) 50 mg tablet TAKE 1-2 TABLETS BY MOUTH EVERY 6-8 HOURS AS NEEDED FOR PAIN        Objective   There were no vitals taken for this visit.   Physical Exam  Vitals reviewed.   Constitutional:       General: He is awake.   Pulmonary:      Effort: Pulmonary effort is normal.   Neurological:      Mental Status: He is alert and oriented to person, place, and time.   Psychiatric:         Attention and Perception: Attention and perception normal.         Behavior: Behavior normal.         Assessment/Plan   Harry Ochoa is a 63 y.o. male who presents to GI clinic for follow-up of chronic pancreatitis.  We discussed that his chronic pancreatitis is progressive disease, in the setting of worsening diabetes, change in his characteristic abdominal pain, loose stools, and prior elevated CA 19-9 he is at elevated risk of developing a pancreatic malignancy especially due to his ongoing smoking.  I would recommend that he undergo an MRI for further evaluation, as well as obtain further testing to evaluate for malabsorptive diseases and pancreatic insufficiency.  Will also obtain a repeat CA 19-9 considering  prior elevated levels, and based on these findings he may require repeat EUS.  He is also due for colorectal cancer screening.  This was not discussed with the patient, however the order has been placed.  Will have team follow-up with patient and get him scheduled for colonoscopy.  Ideally his MRI will be done prior to his colonoscopy and can be coordinated with an EUS if needed.          Kt Marks MD

## 2024-07-03 ENCOUNTER — LAB (OUTPATIENT)
Dept: LAB | Facility: LAB | Age: 64
End: 2024-07-03
Payer: MEDICARE

## 2024-07-03 DIAGNOSIS — K86.0 ALCOHOL-INDUCED CHRONIC PANCREATITIS (MULTI): ICD-10-CM

## 2024-07-03 DIAGNOSIS — E43 UNSPECIFIED SEVERE PROTEIN-CALORIE MALNUTRITION (MULTI): ICD-10-CM

## 2024-07-03 PROCEDURE — 82653 EL-1 FECAL QUANTITATIVE: CPT

## 2024-07-07 LAB — ELASTASE PANC STL-MCNT: >800 UG/G

## 2024-07-15 DIAGNOSIS — I10 BENIGN HYPERTENSION: ICD-10-CM

## 2024-07-15 RX ORDER — EZETIMIBE 10 MG/1
10 TABLET ORAL DAILY
Qty: 90 TABLET | Refills: 3 | Status: SHIPPED | OUTPATIENT
Start: 2024-07-15

## 2024-07-17 LAB
NON-UH HIE A/G RATIO: 1
NON-UH HIE ALB: 3.6 G/DL (ref 3.4–5)
NON-UH HIE ALK PHOS: 116 UNIT/L (ref 45–117)
NON-UH HIE BILIRUBIN, TOTAL: 1 MG/DL (ref 0.3–1.2)
NON-UH HIE BUN/CREAT RATIO: 12.7
NON-UH HIE BUN: 14 MG/DL (ref 9–23)
NON-UH HIE CALCIUM: 9.9 MG/DL (ref 8.7–10.4)
NON-UH HIE CALCULATED LDL CHOLESTEROL: 72 MG/DL (ref 60–130)
NON-UH HIE CALCULATED OSMOLALITY: 284 MOSM/KG (ref 275–295)
NON-UH HIE CHLORIDE: 102 MMOL/L (ref 98–107)
NON-UH HIE CHOLESTEROL: 164 MG/DL (ref 100–200)
NON-UH HIE CO2, VENOUS: 24 MMOL/L (ref 20–31)
NON-UH HIE CREATININE, URINE MG/DL: 156.6 MG/DL
NON-UH HIE CREATININE: 1.1 MG/DL (ref 0.6–1.1)
NON-UH HIE DIRECT LDL: 87 MG/DL
NON-UH HIE GFR AA: >60
NON-UH HIE GLOBULIN: 3.7 G/DL
NON-UH HIE GLOMERULAR FILTRATION RATE: >60 ML/MIN/1.73M?
NON-UH HIE GLUCOSE: 262 MG/DL (ref 74–106)
NON-UH HIE GOT: 26 UNIT/L (ref 15–37)
NON-UH HIE GPT: 45 UNIT/L (ref 10–49)
NON-UH HIE HDL CHOLESTEROL: 53 MG/DL (ref 40–60)
NON-UH HIE HGB A1C: 9.6 %
NON-UH HIE K: 4.2 MMOL/L (ref 3.5–5.1)
NON-UH HIE MICROALBUMIN, URINE MG/L: 9 MG/L
NON-UH HIE MICROALBUMIN/CREATININE RATIO: 6 MG MALB/GM CREAT (ref 0–30)
NON-UH HIE NA: 137 MMOL/L (ref 135–145)
NON-UH HIE TOTAL CHOL/HDL CHOL RATIO: 3.1
NON-UH HIE TOTAL PROTEIN: 7.3 G/DL (ref 5.7–8.2)
NON-UH HIE TRIGLYCERIDES: 197 MG/DL (ref 30–150)
NON-UH HIE TSH: 3.45 UIU/ML (ref 0.55–4.78)
NON-UH HIE VIT D 25: 53 NG/ML

## 2024-07-17 RX ORDER — INSULIN LISPRO 100 [IU]/ML
6 INJECTION, SOLUTION INTRAVENOUS; SUBCUTANEOUS DAILY
COMMUNITY

## 2024-07-18 ENCOUNTER — ANESTHESIA (OUTPATIENT)
Dept: GASTROENTEROLOGY | Facility: HOSPITAL | Age: 64
End: 2024-07-18
Payer: MEDICARE

## 2024-07-18 ENCOUNTER — ANESTHESIA EVENT (OUTPATIENT)
Dept: GASTROENTEROLOGY | Facility: HOSPITAL | Age: 64
End: 2024-07-18
Payer: MEDICARE

## 2024-07-18 ENCOUNTER — HOSPITAL ENCOUNTER (OUTPATIENT)
Dept: GASTROENTEROLOGY | Facility: HOSPITAL | Age: 64
Discharge: HOME | End: 2024-07-18
Payer: MEDICARE

## 2024-07-18 VITALS
DIASTOLIC BLOOD PRESSURE: 77 MMHG | HEIGHT: 68 IN | HEART RATE: 51 BPM | RESPIRATION RATE: 16 BRPM | TEMPERATURE: 97.5 F | OXYGEN SATURATION: 98 % | BODY MASS INDEX: 31.71 KG/M2 | WEIGHT: 209.22 LBS | SYSTOLIC BLOOD PRESSURE: 117 MMHG

## 2024-07-18 DIAGNOSIS — Z12.11 COLON CANCER SCREENING: ICD-10-CM

## 2024-07-18 LAB — GLUCOSE BLD MANUAL STRIP-MCNC: 149 MG/DL (ref 74–99)

## 2024-07-18 PROCEDURE — 45385 COLONOSCOPY W/LESION REMOVAL: CPT | Performed by: STUDENT IN AN ORGANIZED HEALTH CARE EDUCATION/TRAINING PROGRAM

## 2024-07-18 PROCEDURE — 3700000001 HC GENERAL ANESTHESIA TIME - INITIAL BASE CHARGE

## 2024-07-18 PROCEDURE — 7100000009 HC PHASE TWO TIME - INITIAL BASE CHARGE

## 2024-07-18 PROCEDURE — 3700000002 HC GENERAL ANESTHESIA TIME - EACH INCREMENTAL 1 MINUTE

## 2024-07-18 PROCEDURE — 2500000004 HC RX 250 GENERAL PHARMACY W/ HCPCS (ALT 636 FOR OP/ED): Performed by: ANESTHESIOLOGIST ASSISTANT

## 2024-07-18 PROCEDURE — 82947 ASSAY GLUCOSE BLOOD QUANT: CPT

## 2024-07-18 PROCEDURE — 7100000010 HC PHASE TWO TIME - EACH INCREMENTAL 1 MINUTE

## 2024-07-18 PROCEDURE — A45378 PR COLONOSCOPY,DIAGNOSTIC: Performed by: ANESTHESIOLOGY

## 2024-07-18 PROCEDURE — A45378 PR COLONOSCOPY,DIAGNOSTIC: Performed by: ANESTHESIOLOGIST ASSISTANT

## 2024-07-18 RX ORDER — ONDANSETRON HYDROCHLORIDE 2 MG/ML
4 INJECTION, SOLUTION INTRAVENOUS ONCE AS NEEDED
Status: DISCONTINUED | OUTPATIENT
Start: 2024-07-18 | End: 2024-07-19 | Stop reason: HOSPADM

## 2024-07-18 RX ORDER — PROPOFOL 10 MG/ML
INJECTION, EMULSION INTRAVENOUS AS NEEDED
Status: DISCONTINUED | OUTPATIENT
Start: 2024-07-18 | End: 2024-07-18

## 2024-07-18 RX ORDER — SODIUM CHLORIDE, SODIUM LACTATE, POTASSIUM CHLORIDE, CALCIUM CHLORIDE 600; 310; 30; 20 MG/100ML; MG/100ML; MG/100ML; MG/100ML
20 INJECTION, SOLUTION INTRAVENOUS CONTINUOUS
Status: DISCONTINUED | OUTPATIENT
Start: 2024-07-18 | End: 2024-07-19 | Stop reason: HOSPADM

## 2024-07-18 SDOH — HEALTH STABILITY: MENTAL HEALTH: CURRENT SMOKER: 0

## 2024-07-18 ASSESSMENT — PAIN - FUNCTIONAL ASSESSMENT
PAIN_FUNCTIONAL_ASSESSMENT: 0-10
PAIN_FUNCTIONAL_ASSESSMENT: UNABLE TO SELF-REPORT

## 2024-07-18 ASSESSMENT — PAIN SCALES - GENERAL
PAINLEVEL_OUTOF10: 0 - NO PAIN

## 2024-07-18 ASSESSMENT — COLUMBIA-SUICIDE SEVERITY RATING SCALE - C-SSRS
2. HAVE YOU ACTUALLY HAD ANY THOUGHTS OF KILLING YOURSELF?: NO
1. IN THE PAST MONTH, HAVE YOU WISHED YOU WERE DEAD OR WISHED YOU COULD GO TO SLEEP AND NOT WAKE UP?: NO
6. HAVE YOU EVER DONE ANYTHING, STARTED TO DO ANYTHING, OR PREPARED TO DO ANYTHING TO END YOUR LIFE?: NO

## 2024-07-18 NOTE — ANESTHESIA POSTPROCEDURE EVALUATION
Patient: Harry Ochoa    Procedure Summary       Date: 07/18/24 Room / Location: [facility]    Anesthesia Start: 1106 Anesthesia Stop: 1140    Procedure: COLONOSCOPY Diagnosis: Colon cancer screening    Scheduled Providers: Kt Marks MD Responsible Provider: Jaquan Grant MD    Anesthesia Type: MAC ASA Status: 2            Anesthesia Type: MAC    Vitals Value Taken Time   BP 89/54 07/18/24 1140   Temp 36.47 07/18/24 1140   Pulse 58 07/18/24 1140   Resp 16 07/18/24 1140   SpO2 958 07/18/24 1140       Anesthesia Post Evaluation    Patient location during evaluation: PACU  Patient participation: waiting for patient participation  Level of consciousness: obtunded/minimal responses  Pain management: adequate  Airway patency: patent  Cardiovascular status: acceptable  Respiratory status: acceptable  Hydration status: acceptable  Postoperative Nausea and Vomiting: none      No notable events documented.

## 2024-07-18 NOTE — ANESTHESIA PREPROCEDURE EVALUATION
Patient: Harry Ochoa    Procedure Information       Date/Time: 07/18/24 1100    Scheduled providers: Kt Marks MD    Procedure: COLONOSCOPY    Location: [facility]            Relevant Problems   No relevant active problems       Clinical information reviewed:   Tobacco  Allergies  Meds   Med Hx  Surg Hx   Fam Hx  Soc Hx        NPO Detail:  NPO/Void Status  Date of Last Liquid: 07/17/24  Time of Last Liquid: 2000  Date of Last Solid: 07/16/24         Physical Exam    Airway  Mallampati: I  TM distance: >3 FB  Neck ROM: full     Cardiovascular - normal exam     Dental - normal exam     Pulmonary - normal exam     Abdominal - normal exam         Anesthesia Plan    History of general anesthesia?: yes  History of complications of general anesthesia?: no    ASA 2     MAC     The patient is not a current smoker.  Patient was not previously instructed to abstain from smoking on day of procedure.  Patient did not smoke on day of procedure.    intravenous induction   Postoperative administration of opioids is intended.  Anesthetic plan and risks discussed with patient.  Use of blood products discussed with patient who.    Plan discussed with [facility].

## 2024-07-18 NOTE — DISCHARGE INSTRUCTIONS
Patient Instructions after an Endoscopy      Post-procedure recommendations:  - The patient will be observed post-procedure, until all discharge criteria are met.   - Discharge the patient to home with family member/escort.  - Resume previous diet.  - Continue present medications.  - Observe patient's clinical course following today's procedure with therapeutic intervention.   - Watch for bleeding, perforation, and infection.   - Follow-up with referring physician.   - Return to primary care physician.  - The patient has a contact number available for  emergencies.  The signs and symptoms of potential delayed complications were discussed with the patient.  Return to normal activities tomorrow.  Written discharge instructions were provided to the patient.    The anesthetics, sedatives or narcotics which were given to you today will be acting in your body for the next 24 hours, so you might feel a little sleepy or groggy.  This feeling should slowly wear off. Carefully read and follow the instructions.     You received sedation today:  - Do not drive or operate any machinery or power tools of any kind.   - No alcoholic beverages today, not even beer or wine.  - Do not make any important decisions or sign any legal documents.  - No over the counter medications that contain alcohol or that may cause drowsiness.  - Do not make any important decisions or sign any legal documents.    While it is common to experience mild to moderate abdominal distention, gas, or belching after your procedure, if any of these symptoms occur following discharge from the GI Lab or within one week of having your procedure, call the [facility] to be advised whether a visit to your nearest Urgent Care or Emergency Department is indicated.  Take this paper with you if you go:  - If you develop an allergic reaction to the medications that were given during your procedure such as difficulty breathing, rash, hives, severe nausea,  vomiting or lightheadedness.  - If you experience chest pain, shortness of breath, severe abdominal pain, fevers and chills.  - If you develop signs and symptoms of bleeding such as blood in your spit, if your stools turn black, tarry, or bloody.  - If you have not urinated within 8 hours following your procedure.  - If your IV site becomes painful, red, inflamed, or looks infected.       If you experience any problems or have any questions following discharge from the GI Lab, please call: [phone]

## 2024-07-21 ENCOUNTER — HOSPITAL ENCOUNTER (OUTPATIENT)
Dept: RADIOLOGY | Facility: HOSPITAL | Age: 64
Discharge: HOME | End: 2024-07-21
Payer: MEDICARE

## 2024-07-21 DIAGNOSIS — K86.0 ALCOHOL-INDUCED CHRONIC PANCREATITIS (MULTI): ICD-10-CM

## 2024-07-21 PROCEDURE — A9575 INJ GADOTERATE MEGLUMI 0.1ML: HCPCS | Performed by: STUDENT IN AN ORGANIZED HEALTH CARE EDUCATION/TRAINING PROGRAM

## 2024-07-21 PROCEDURE — 2550000001 HC RX 255 CONTRASTS: Performed by: STUDENT IN AN ORGANIZED HEALTH CARE EDUCATION/TRAINING PROGRAM

## 2024-07-21 PROCEDURE — 74183 MRI ABD W/O CNTR FLWD CNTR: CPT | Performed by: RADIOLOGY

## 2024-07-21 PROCEDURE — 76376 3D RENDER W/INTRP POSTPROCES: CPT | Performed by: RADIOLOGY

## 2024-07-21 PROCEDURE — 74183 MRI ABD W/O CNTR FLWD CNTR: CPT

## 2024-07-21 RX ORDER — GADOTERATE MEGLUMINE 376.9 MG/ML
18 INJECTION INTRAVENOUS
Status: COMPLETED | OUTPATIENT
Start: 2024-07-21 | End: 2024-07-21

## 2024-07-29 LAB
LABORATORY COMMENT REPORT: NORMAL
PATH REPORT.FINAL DX SPEC: NORMAL
PATH REPORT.GROSS SPEC: NORMAL
PATH REPORT.TOTAL CANCER: NORMAL

## 2024-08-20 ENCOUNTER — APPOINTMENT (OUTPATIENT)
Dept: GASTROENTEROLOGY | Facility: CLINIC | Age: 64
End: 2024-08-20
Payer: MEDICARE

## 2024-08-20 DIAGNOSIS — E55.9 VITAMIN D DEFICIENCY: ICD-10-CM

## 2024-08-20 DIAGNOSIS — E11.69 TYPE 2 DIABETES MELLITUS WITH OTHER SPECIFIED COMPLICATION, UNSPECIFIED WHETHER LONG TERM INSULIN USE (MULTI): ICD-10-CM

## 2024-08-20 DIAGNOSIS — K86.0 ALCOHOL-INDUCED CHRONIC PANCREATITIS (MULTI): Primary | ICD-10-CM

## 2024-08-20 PROCEDURE — 99443 PR PHYS/QHP TELEPHONE EVALUATION 21-30 MIN: CPT | Performed by: STUDENT IN AN ORGANIZED HEALTH CARE EDUCATION/TRAINING PROGRAM

## 2024-08-20 PROCEDURE — 4010F ACE/ARB THERAPY RXD/TAKEN: CPT | Performed by: STUDENT IN AN ORGANIZED HEALTH CARE EDUCATION/TRAINING PROGRAM

## 2024-08-20 PROCEDURE — 99214 OFFICE O/P EST MOD 30 MIN: CPT | Performed by: STUDENT IN AN ORGANIZED HEALTH CARE EDUCATION/TRAINING PROGRAM

## 2024-08-20 NOTE — PROGRESS NOTES
Subjective   Virtual or Telephone Consent    A telephone visit (audio only) between the patient (at the originating site) and the provider (at the distant site) was utilized to provide this telehealth service.   Verbal consent was requested and obtained from Harry Ochoa on this date, 08/26/24 for a telehealth visit.     History of Present Illness:   Harry Ochoa is a 63 y.o. male who presents to GI clinic today telephone encounter, to follow-up on his recent lab work.  He has a history of chronic pancreatitis and continues to smoke.  He underwent an MRI which demonstrated evolving features of chronic pancreatitis and persistent but stable ductal dilation.  There has been associated pancreatic atrophy.  He also underwent a CA 19-9 which was 60 down from prior 124.  Currently feels well. has minimal complaints.      Past Medical History   has a past medical history of Other specified counseling, Overweight, Personal history of nicotine dependence (04/15/2019), Personal history of other diseases of the musculoskeletal system and connective tissue, Personal history of other diseases of the nervous system and sense organs, Personal history of other endocrine, nutritional and metabolic disease, and Personal history of other specified conditions.     Social History   reports that he has been smoking cigarettes. He has never used smokeless tobacco. He reports that he does not currently use alcohol. He reports that he does not use drugs.     Family History  family history includes Lymphoma in his father.     Allergies  Allergies   Allergen Reactions    Indomethacin Unknown and Confusion     Other Reaction(s): Confusion/Disorientation    Rosuvastatin Other and Hives       Medications  Current Outpatient Medications   Medication Instructions    Accu-Chek Guide Glucose Meter misc TEST BLOOD SUGAR DAILY AS DIRECTED    Accu-Chek Guide test strips strip TESTS 3 TIMES PER DAY    Accu-Chek Softclix Lancets misc TEST BLOOD SUGAR  "EVERY DAY AS DIRECTED    ammonium lactate (Lac-Hydrin) 12 % lotion APPLY TO DRY SKIN ONCE A DAY AFTER SHOWERING    amoxicillin (Amoxil) 500 mg capsule TAKE 4 CAPSULES BY MOUTH 1 HOUR PRIOR TO DENTAL APPT    aspirin 81 mg EC tablet 1 tablet, oral, Daily    atenolol (TENORMIN) 25 mg    atorvastatin (LIPITOR) 80 mg, oral, Daily    BD Ultra-Fine Mini Pen Needle 31 gauge x 3/16\" needle     DULoxetine (CYMBALTA) 30 mg, oral, Daily    ergocalciferol (Vitamin D-2) 1.25 MG (95898 UT) capsule 50,000 Int'l Units, oral, Every 14 days    ezetimibe (ZETIA) 10 mg, oral, Daily    insulin lispro (HUMALOG) 6 Units, subcutaneous, Daily, Take as directed per insulin instructions.    ketoconazole (NIZOral) 2 % cream APPLY TWICE TO AFFECTED AREAS ON THE FACE WHEN FLARING    Lantus Solostar U-100 Insulin 100 unit/mL (3 mL) pen Inject 30 Units under the skin once daily in the morning.    levothyroxine (SYNTHROID, LEVOXYL) 125 mcg, oral, Daily    losartan (Cozaar) 25 mg tablet Take 1 tablet (25 mg) by mouth once daily.    nitroglycerin (Nitrostat) 0.4 mg SL tablet DISSOLVE 1 TABLET UNDER THE TONGUE AS NEEDED FOR CHEST PAIN. AS DIRECTED    omeprazole (PRILOSEC) 20 mg, oral, Daily before breakfast        Objective   There were no vitals taken for this visit.   Physical Exam  Pulmonary:      Effort: No respiratory distress.      Breath sounds: No wheezing.         Assessment/Plan   Harry Ochoa is a 63 y.o. male who presents to GI clinic for follow-up of chronic pancreatitis.  He underwent a repeat imaging which demonstrated stable PD dilation with evolving chronic pancreatitis which I suspect is being primarily driven by his ongoing smoking.  We had extensive smoking counseling.  I have a lower suspicion of an ongoing malignant process at this time as his CA 19-9 is downtrending rather than uptrending.  Will continue to monitor his CA 19-9.  Recent vitamin D levels are normal.  Does not have any diarrhea.  No signs of malabsorption/EPI. " Last A1c 7.4. Suspect diabetes driven by chronic pancreatitis.           Kt Marks MD

## 2024-08-21 DIAGNOSIS — I10 BENIGN HYPERTENSION: Primary | ICD-10-CM

## 2024-08-21 RX ORDER — ATENOLOL 25 MG/1
25 TABLET ORAL DAILY
Qty: 90 TABLET | Refills: 3 | Status: SHIPPED | OUTPATIENT
Start: 2024-08-21

## 2024-08-21 RX ORDER — LOSARTAN POTASSIUM 25 MG/1
25 TABLET ORAL DAILY
Qty: 90 TABLET | Refills: 3 | Status: SHIPPED | OUTPATIENT
Start: 2024-08-21

## 2024-10-01 DIAGNOSIS — E03.9 ADULT HYPOTHYROIDISM: ICD-10-CM

## 2024-10-01 RX ORDER — LEVOTHYROXINE SODIUM 125 UG/1
125 TABLET ORAL DAILY
Qty: 90 TABLET | Refills: 0 | Status: SHIPPED | OUTPATIENT
Start: 2024-10-01

## 2024-10-11 DIAGNOSIS — E78.00 HYPERCHOLESTEROLEMIA: ICD-10-CM

## 2024-10-14 RX ORDER — ATORVASTATIN CALCIUM 80 MG/1
80 TABLET, FILM COATED ORAL DAILY
Qty: 90 TABLET | Refills: 3 | Status: SHIPPED | OUTPATIENT
Start: 2024-10-14

## 2024-10-21 DIAGNOSIS — K21.9 GASTROESOPHAGEAL REFLUX DISEASE, UNSPECIFIED WHETHER ESOPHAGITIS PRESENT: ICD-10-CM

## 2024-10-21 RX ORDER — OMEPRAZOLE 20 MG/1
20 CAPSULE, DELAYED RELEASE ORAL
Qty: 60 CAPSULE | Refills: 5 | Status: SHIPPED | OUTPATIENT
Start: 2024-10-21

## 2024-10-29 PROBLEM — F10.929 ALCOHOLIC INTOXICATION (CMS-HCC): Status: RESOLVED | Noted: 2024-03-29 | Resolved: 2024-10-29

## 2024-10-29 PROBLEM — E78.00 HYPERCHOLESTEROLEMIA: Chronic | Status: ACTIVE | Noted: 2023-05-30

## 2024-11-18 PROBLEM — H90.8 MIXED CONDUCTIVE AND SENSORINEURAL HEARING LOSS: Status: RESOLVED | Noted: 2023-05-30 | Resolved: 2024-11-18

## 2024-11-18 PROBLEM — M48.00 SPINAL STENOSIS: Status: RESOLVED | Noted: 2023-10-09 | Resolved: 2024-11-18

## 2024-11-18 PROBLEM — M19.019 ARTHROPATHY OF SHOULDER REGION: Status: RESOLVED | Noted: 2023-11-07 | Resolved: 2024-11-18

## 2024-11-18 PROBLEM — J34.3 HYPERTROPHY OF NASAL TURBINATES: Status: RESOLVED | Noted: 2023-05-30 | Resolved: 2024-11-18

## 2024-11-18 PROBLEM — M25.511 RIGHT SHOULDER PAIN: Status: RESOLVED | Noted: 2023-11-07 | Resolved: 2024-11-18

## 2024-11-18 PROBLEM — H91.93 BILATERAL HEARING LOSS: Status: RESOLVED | Noted: 2023-05-30 | Resolved: 2024-11-18

## 2024-11-18 PROBLEM — H65.31 CHRONIC MUCOID OTITIS MEDIA OF RIGHT EAR: Status: RESOLVED | Noted: 2023-05-30 | Resolved: 2024-11-18

## 2024-11-18 PROBLEM — E11.9 TYPE 2 DIABETES MELLITUS: Chronic | Status: ACTIVE | Noted: 2023-05-30

## 2024-11-18 PROBLEM — J18.9 PNEUMONIA: Status: RESOLVED | Noted: 2023-05-30 | Resolved: 2024-11-18

## 2024-11-18 PROBLEM — I10 BENIGN HYPERTENSION: Chronic | Status: ACTIVE | Noted: 2023-05-30

## 2024-11-18 PROBLEM — J31.0 CHRONIC RHINITIS: Status: RESOLVED | Noted: 2023-05-30 | Resolved: 2024-11-18

## 2024-11-18 PROBLEM — R10.9 ABDOMINAL PAIN: Status: RESOLVED | Noted: 2023-05-30 | Resolved: 2024-11-18

## 2024-11-18 PROBLEM — R91.8 INFILTRATE OF LUNG PRESENT ON IMAGING STUDY: Status: RESOLVED | Noted: 2023-05-30 | Resolved: 2024-11-18

## 2024-11-18 PROBLEM — G54.1 LUMBOSACRAL PLEXUS LESION: Status: RESOLVED | Noted: 2019-06-01 | Resolved: 2024-11-18

## 2024-11-18 PROBLEM — H93.8X1 SENSATION OF PLUGGED EAR ON RIGHT SIDE: Status: RESOLVED | Noted: 2023-05-30 | Resolved: 2024-11-18

## 2024-11-18 PROBLEM — J34.89 NASAL DISCHARGE: Status: RESOLVED | Noted: 2023-05-30 | Resolved: 2024-11-18

## 2024-11-18 PROBLEM — Z86.39 HISTORY OF HYPOTHYROIDISM: Status: RESOLVED | Noted: 2024-03-29 | Resolved: 2024-11-18

## 2024-11-18 PROBLEM — R91.8 MULTIPLE PULMONARY NODULES: Status: RESOLVED | Noted: 2023-05-30 | Resolved: 2024-11-18

## 2024-11-18 PROBLEM — J34.2 DEVIATED NASAL SEPTUM: Status: RESOLVED | Noted: 2023-05-30 | Resolved: 2024-11-18

## 2024-11-18 PROBLEM — M75.41 IMPINGEMENT SYNDROME OF RIGHT SHOULDER: Status: RESOLVED | Noted: 2023-05-28 | Resolved: 2024-11-18

## 2024-11-18 PROBLEM — Z96.611 S/P REVERSE TOTAL SHOULDER ARTHROPLASTY, RIGHT: Status: RESOLVED | Noted: 2023-12-18 | Resolved: 2024-11-18

## 2024-11-18 PROBLEM — M75.120 COMPLETE TEAR OF ROTATOR CUFF: Status: RESOLVED | Noted: 2023-08-28 | Resolved: 2024-11-18

## 2024-11-18 PROBLEM — R09.89 PULMONARY CONGESTION: Status: RESOLVED | Noted: 2023-05-30 | Resolved: 2024-11-18

## 2024-11-18 PROBLEM — M17.12 ARTHRITIS OF LEFT KNEE: Status: RESOLVED | Noted: 2023-08-28 | Resolved: 2024-11-18

## 2024-11-18 PROBLEM — I25.10 ARTERIOSCLEROSIS OF CORONARY ARTERY: Chronic | Status: ACTIVE | Noted: 2023-01-06

## 2024-11-18 PROBLEM — M47.9 SPONDYLOSIS: Status: RESOLVED | Noted: 2023-10-09 | Resolved: 2024-11-18

## 2024-11-18 PROBLEM — K29.00 ACUTE GASTRITIS: Status: RESOLVED | Noted: 2023-05-30 | Resolved: 2024-11-18

## 2024-11-18 PROBLEM — J20.9 ACUTE BRONCHITIS WITH BRONCHOSPASM: Status: RESOLVED | Noted: 2023-05-30 | Resolved: 2024-11-18

## 2024-11-18 PROBLEM — K21.9 GASTROESOPHAGEAL REFLUX DISEASE: Status: RESOLVED | Noted: 2023-05-30 | Resolved: 2024-11-18

## 2024-11-18 PROBLEM — R05.3 CHRONIC COUGH: Status: RESOLVED | Noted: 2023-05-30 | Resolved: 2024-11-18

## 2024-11-18 PROBLEM — R25.2 CRAMPING OF HANDS: Status: RESOLVED | Noted: 2023-05-28 | Resolved: 2024-11-18

## 2024-11-18 PROBLEM — R09.81 CONGESTION OF PARANASAL SINUS: Status: RESOLVED | Noted: 2023-05-30 | Resolved: 2024-11-18

## 2024-11-18 PROBLEM — R09.81 NASAL CONGESTION: Status: RESOLVED | Noted: 2023-05-30 | Resolved: 2024-11-18

## 2024-11-18 PROBLEM — R11.2 NAUSEA AND VOMITING: Status: RESOLVED | Noted: 2024-03-29 | Resolved: 2024-11-18

## 2024-11-18 PROBLEM — E03.9 HYPOTHYROID: Status: RESOLVED | Noted: 2023-10-09 | Resolved: 2024-11-18

## 2024-11-18 NOTE — PROGRESS NOTES
Referred by No ref. provider found    HPI I am seeing Wilson for the first time in 2 years.  Doing well from a cardiac standpoint.  No chest pain or pressure.  He is able to go up and down 2 flights of stairs.  Suffering from type 2 diabetes and pancreatitis chronically.  Scheduled for an impending back surgery at [facility] in December.  He is able to go up and down 2 flights of stairs.  His anginal equivalent is jaw pain which he has not had.  He still smokes 1/2-1 3 cores of a pack per day.    Past Medical History:  Problem List Items Addressed This Visit    None     Past Medical History:   Diagnosis Date    Arteriosclerosis of coronary artery 01/06/2023 2010 = 99% occluded RCA (drug eluting stent placed). Had another 99% stenosis prior to stent in Oct 2012. Presented to [facility] 4/10/2019 with acute inferior wall MI ... FLACO to the mid RCA done. Staged stenting to the LAD.      Hypercholesterolemia 05/30/2023     ... Unable to tolerate many statins ... Trying to get PCSK9 approved      Other specified counseling     Counseling on substance use and abuse    Overweight     Overweight (BMI 25.0-29.9)    Personal history of nicotine dependence 04/15/2019    History of tobacco abuse    Personal history of other diseases of the musculoskeletal system and connective tissue     History of fibromyalgia    Personal history of other diseases of the nervous system and sense organs     History of obstructive sleep apnea    Personal history of other endocrine, nutritional and metabolic disease     History of hypothyroidism    Personal history of other specified conditions     History of chest pain      Past Surgical History:  He has a past surgical history that includes Other surgical history (04/15/2019); Other surgical history (04/15/2019); Other surgical history (11/01/2021); Other surgical history (11/01/2021); Other surgical history (11/01/2021); and Other surgical history (11/01/2021).      Social History:  He  "reports that he has been smoking cigarettes. He has never used smokeless tobacco. He reports that he does not currently use alcohol. He reports that he does not use drugs.    Family History:  Family History   Problem Relation Name Age of Onset    Lymphoma Father       Allergies:  Indomethacin and Rosuvastatin    Outpatient Medications:  Current Outpatient Medications   Medication Instructions    Accu-Chek Guide Glucose Meter misc TEST BLOOD SUGAR DAILY AS DIRECTED    Accu-Chek Guide test strips strip TESTS 3 TIMES PER DAY    Accu-Chek Softclix Lancets misc TEST BLOOD SUGAR EVERY DAY AS DIRECTED    ammonium lactate (Lac-Hydrin) 12 % lotion APPLY TO DRY SKIN ONCE A DAY AFTER SHOWERING    amoxicillin (Amoxil) 500 mg capsule TAKE 4 CAPSULES BY MOUTH 1 HOUR PRIOR TO DENTAL APPT    aspirin 81 mg EC tablet 1 tablet, oral, Daily    atenolol (TENORMIN) 25 mg, oral, Daily    atorvastatin (LIPITOR) 80 mg, oral, Daily    BD Ultra-Fine Mini Pen Needle 31 gauge x 3/16\" needle     DULoxetine (CYMBALTA) 30 mg, oral, Daily    ergocalciferol (Vitamin D-2) 1.25 MG (88033 UT) capsule 50,000 Int'l Units, oral, Every 14 days    ezetimibe (ZETIA) 10 mg, oral, Daily    insulin lispro 6 Units, subcutaneous, Daily, Take as directed per insulin instructions.    ketoconazole (NIZOral) 2 % cream APPLY TWICE TO AFFECTED AREAS ON THE FACE WHEN FLARING    Lantus Solostar U-100 Insulin 100 unit/mL (3 mL) pen Inject 30 Units under the skin once daily in the morning.    levothyroxine (SYNTHROID, LEVOXYL) 125 mcg, oral, Daily    losartan (COZAAR) 25 mg, oral, Daily    nitroglycerin (Nitrostat) 0.4 mg SL tablet DISSOLVE 1 TABLET UNDER THE TONGUE AS NEEDED FOR CHEST PAIN. AS DIRECTED    omeprazole (PRILOSEC) 20 mg, oral, Daily before breakfast     Last Recorded Vitals:  There were no vitals filed for this visit.    Physical Exam  Patient is alert and oriented x3.  HEENT is unremarkable mucous members are moist  Neck no JVP no bruits upstrokes are full " no thyromegaly  Lungs are clear bilaterally.  No wheezing crackles or rales  Heart regular rhythm normal S1-S2 there is no S3 no murmurs are heard.  Abdomen is soft bs are positive nontender nondistended no organomegaly no pulsatile masses  Extremities have no edema.  Distal pulses present palpable.  Neuro is grossly nonfocal  Skin has no rashes     Last Labs:  CBC -  Lab Results   Component Value Date    WBC 15.3 (H) 07/13/2022    HGB 16.7 07/13/2022    HCT 52.0 07/13/2022    MCV 94 07/13/2022     07/13/2022     CMP -  Lab Results   Component Value Date    CALCIUM 10.1 07/13/2022    PROT 7.5 07/13/2022    ALBUMIN 4.5 07/13/2022    AST 27 07/13/2022    ALT 38 07/13/2022    ALKPHOS 90 07/13/2022    BILITOT 1.1 07/13/2022     LIPID PANEL -   Lab Results   Component Value Date    CHOL 166 07/13/2022    HDL 53.1 07/13/2022    CHHDL 3.1 07/13/2022    VLDL 38 07/13/2022    TRIG 191 (H) 07/13/2022     RENAL FUNCTION PANEL -   Lab Results   Component Value Date    K 4.4 07/13/2022     Lab Results   Component Value Date    HGBA1C 7.4 (A) 07/13/2022        Procedure  CT [07/19/2021]: Few small bilat noncalcified pulm nodules (up to 3 mm), likely benign. Mild upper lung predom centrilobular / paraseptal emphysema. Mild subpleural ground-glass opacities w/in bilat lungs, particularly dependent lower lobes, may represent atel vs smoking-related interstitial lung abnormality. Status post LAD and RCA stenting.      PHARM NST [05/20/2021]: Normal â€“ 59%     PCI @ [facility] (04/10/2019): CAD ... FLACO Successful of LAD. Inferior ST elevation MI Severe Mild LAD. Mild circumflex artery disease. Low-Normal LVF. Mildly elev L-heart filling pressure.     ECHO (04/09/2019): Normal 60-65% EF. AV is trileaflet. AV has Mild fibrocalcific changes.      PHARM NST [02/15/2016]: Normal - 61%     EX NST [03/20/2013]: Above-average ex tank. Negative stress ECG for signfcnt STS chngs. Appropriate HR recovery. Nrml resting LV size/sys func. Nrml  stress echo response. Good prognosis.     CATH [10/25/2012]: Severe focal stenosis proximal to a prior-placed promus element stent treated with balloon angioplasty and FLACO placement. minimal disease left cx and lad. preserved lvf     CATH [12/2010]: patent stent in RCA, negligible disease elsewhere     PCIÂ [08/2010]: 99% RCA - FLACO placed. Normal vessels elsewhere. Then again in 2012 prox to prior stent.     CAC [12/2009] = zero     Assessment/Plan   1. CAD. Stenting of RCA 2010, and again in 2012. April 2019 anterior MI. Stent to the LAD. No further jaw pain which is related to his heart.  He is symptoms of chest pain which is more consistent with reflux and resolves with [place].  Jaw pain is what his anginal equivalent is and he has not had that.  Continue antiplatelet therapy, atenolol, atorvastatin, Zetia and losartan.  EKG today.     2. Hypertension controlled     3. Hyperlipidemia. His LDL cholesterol is 75 his HDL cholesterol is 53 liver enzymes are normal.  Labs been drawn by his endocrinologist that are at [facility].  Target LDL less than 70 closer to 55     4. Extreme fatigue.  This remains an ongoing issue..     5. Tobacco abuse.  3/4 PPD     6. Persistent leukocytosis. Unclear etiology. It would be reasonable to consider hematologic evaluation. I will defer to primary care.    7. Preop Clearance- Back surgery in 12/2024. No anginal sx's. He is able to go up 2 flights of stairs. Risk Moderately elevated but acceptable risk  Hold ASA x 5 days.    EKG today.  Return 1 year    Gigi Pryor MD     Instructions and follow up

## 2024-11-19 ENCOUNTER — OFFICE VISIT (OUTPATIENT)
Dept: CARDIOLOGY | Facility: CLINIC | Age: 64
End: 2024-11-19
Payer: MEDICARE

## 2024-11-19 VITALS
BODY MASS INDEX: 31.43 KG/M2 | WEIGHT: 205 LBS | HEART RATE: 55 BPM | SYSTOLIC BLOOD PRESSURE: 128 MMHG | DIASTOLIC BLOOD PRESSURE: 72 MMHG | OXYGEN SATURATION: 99 %

## 2024-11-19 DIAGNOSIS — I25.10 ARTERIOSCLEROSIS OF CORONARY ARTERY: Primary | Chronic | ICD-10-CM

## 2024-11-19 DIAGNOSIS — E78.00 HYPERCHOLESTEROLEMIA: Chronic | ICD-10-CM

## 2024-11-19 DIAGNOSIS — Z01.818 PREOPERATIVE CLEARANCE: ICD-10-CM

## 2024-11-19 DIAGNOSIS — Z72.0 TOBACCO ABUSE: ICD-10-CM

## 2024-11-19 DIAGNOSIS — I10 BENIGN HYPERTENSION: ICD-10-CM

## 2024-11-19 LAB
ATRIAL RATE: 59 BPM
P AXIS: 70 DEGREES
P OFFSET: 191 MS
P ONSET: 134 MS
PR INTERVAL: 148 MS
Q ONSET: 208 MS
QRS COUNT: 9 BEATS
QRS DURATION: 96 MS
QT INTERVAL: 454 MS
QTC CALCULATION(BAZETT): 449 MS
QTC FREDERICIA: 451 MS
R AXIS: -33 DEGREES
T AXIS: 36 DEGREES
T OFFSET: 435 MS
VENTRICULAR RATE: 59 BPM

## 2024-11-19 PROCEDURE — 1159F MED LIST DOCD IN RCRD: CPT | Performed by: INTERNAL MEDICINE

## 2024-11-19 PROCEDURE — 3074F SYST BP LT 130 MM HG: CPT | Performed by: INTERNAL MEDICINE

## 2024-11-19 PROCEDURE — 4010F ACE/ARB THERAPY RXD/TAKEN: CPT | Performed by: INTERNAL MEDICINE

## 2024-11-19 PROCEDURE — 99214 OFFICE O/P EST MOD 30 MIN: CPT | Performed by: INTERNAL MEDICINE

## 2024-11-19 PROCEDURE — 93005 ELECTROCARDIOGRAM TRACING: CPT | Performed by: INTERNAL MEDICINE

## 2024-11-19 PROCEDURE — 3078F DIAST BP <80 MM HG: CPT | Performed by: INTERNAL MEDICINE

## 2024-11-19 NOTE — PATIENT INSTRUCTIONS
1. CAD. Stenting of RCA 2010, and again in 2012. April 2019 anterior MI. Stent to the LAD. No further jaw pain which is related to his heart.  He is symptoms of chest pain which is more consistent with reflux and resolves with [place].  Jaw pain is what his anginal equivalent is and he has not had that.  Continue antiplatelet therapy, atenolol, atorvastatin, Zetia and losartan.  EKG today.     2. Hypertension controlled     3. Hyperlipidemia. His LDL cholesterol is 75 his HDL cholesterol is 53 liver enzymes are normal.  Labs been drawn by his endocrinologist that are at [facility].  Target LDL less than 70 closer to 55     4. Extreme fatigue.  This remains an ongoing issue..     5. Tobacco abuse.  3/4 PPD     6. Persistent leukocytosis. Unclear etiology. It would be reasonable to consider hematologic evaluation. I will defer to primary care.    7. Preop Clearance- Back surgery in 12/2024. No anginal sx's. He is able to go up 2 flights of stairs. Risk Moderately elevated but acceptable risk  Hold ASA x 5 days.    EKG today.  Return 1 year

## 2025-01-26 ENCOUNTER — APPOINTMENT (OUTPATIENT)
Dept: RADIOLOGY | Facility: HOSPITAL | Age: 65
End: 2025-01-26
Payer: MEDICARE

## 2025-01-26 ENCOUNTER — HOSPITAL ENCOUNTER (EMERGENCY)
Facility: HOSPITAL | Age: 65
Discharge: HOME | End: 2025-01-26
Attending: STUDENT IN AN ORGANIZED HEALTH CARE EDUCATION/TRAINING PROGRAM
Payer: MEDICARE

## 2025-01-26 VITALS
RESPIRATION RATE: 16 BRPM | SYSTOLIC BLOOD PRESSURE: 96 MMHG | HEIGHT: 68 IN | DIASTOLIC BLOOD PRESSURE: 55 MMHG | WEIGHT: 200 LBS | HEART RATE: 52 BPM | BODY MASS INDEX: 30.31 KG/M2 | TEMPERATURE: 97.2 F | OXYGEN SATURATION: 97 %

## 2025-01-26 DIAGNOSIS — M54.50 ACUTE LEFT-SIDED LOW BACK PAIN WITHOUT SCIATICA: Primary | ICD-10-CM

## 2025-01-26 LAB
ALBUMIN SERPL BCP-MCNC: 4 G/DL (ref 3.4–5)
ALP SERPL-CCNC: 145 U/L (ref 33–136)
ALT SERPL W P-5'-P-CCNC: 17 U/L (ref 10–52)
ANION GAP SERPL CALC-SCNC: 13 MMOL/L (ref 10–20)
APPEARANCE UR: CLEAR
AST SERPL W P-5'-P-CCNC: 17 U/L (ref 9–39)
BASOPHILS # BLD AUTO: 0.18 X10*3/UL (ref 0–0.1)
BASOPHILS NFR BLD AUTO: 1.2 %
BILIRUB SERPL-MCNC: 0.9 MG/DL (ref 0–1.2)
BILIRUB UR STRIP.AUTO-MCNC: NEGATIVE MG/DL
BUN SERPL-MCNC: 12 MG/DL (ref 6–23)
CALCIUM SERPL-MCNC: 10.1 MG/DL (ref 8.6–10.3)
CHLORIDE SERPL-SCNC: 102 MMOL/L (ref 98–107)
CO2 SERPL-SCNC: 30 MMOL/L (ref 21–32)
COLOR UR: YELLOW
CREAT SERPL-MCNC: 0.95 MG/DL (ref 0.5–1.3)
EGFRCR SERPLBLD CKD-EPI 2021: 89 ML/MIN/1.73M*2
EOSINOPHIL # BLD AUTO: 0.28 X10*3/UL (ref 0–0.7)
EOSINOPHIL NFR BLD AUTO: 1.8 %
ERYTHROCYTE [DISTWIDTH] IN BLOOD BY AUTOMATED COUNT: 13.5 % (ref 11.5–14.5)
ERYTHROCYTE [SEDIMENTATION RATE] IN BLOOD BY WESTERGREN METHOD: 7 MM/H (ref 0–20)
GLUCOSE SERPL-MCNC: 135 MG/DL (ref 74–99)
GLUCOSE UR STRIP.AUTO-MCNC: NORMAL MG/DL
HCT VFR BLD AUTO: 45.1 % (ref 41–52)
HGB BLD-MCNC: 14.6 G/DL (ref 13.5–17.5)
HOLD SPECIMEN: NORMAL
IMM GRANULOCYTES # BLD AUTO: 0.12 X10*3/UL (ref 0–0.7)
IMM GRANULOCYTES NFR BLD AUTO: 0.8 % (ref 0–0.9)
KETONES UR STRIP.AUTO-MCNC: NEGATIVE MG/DL
LEUKOCYTE ESTERASE UR QL STRIP.AUTO: NEGATIVE
LYMPHOCYTES # BLD AUTO: 2.26 X10*3/UL (ref 1.2–4.8)
LYMPHOCYTES NFR BLD AUTO: 14.5 %
MAGNESIUM SERPL-MCNC: 2.13 MG/DL (ref 1.6–2.4)
MCH RBC QN AUTO: 29.7 PG (ref 26–34)
MCHC RBC AUTO-ENTMCNC: 32.4 G/DL (ref 32–36)
MCV RBC AUTO: 92 FL (ref 80–100)
MONOCYTES # BLD AUTO: 1.01 X10*3/UL (ref 0.1–1)
MONOCYTES NFR BLD AUTO: 6.5 %
NEUTROPHILS # BLD AUTO: 11.73 X10*3/UL (ref 1.2–7.7)
NEUTROPHILS NFR BLD AUTO: 75.2 %
NITRITE UR QL STRIP.AUTO: NEGATIVE
NRBC BLD-RTO: 0 /100 WBCS (ref 0–0)
PH UR STRIP.AUTO: 6.5 [PH]
PLATELET # BLD AUTO: 453 X10*3/UL (ref 150–450)
POTASSIUM SERPL-SCNC: 4.6 MMOL/L (ref 3.5–5.3)
PROT SERPL-MCNC: 7.3 G/DL (ref 6.4–8.2)
PROT UR STRIP.AUTO-MCNC: NEGATIVE MG/DL
RBC # BLD AUTO: 4.92 X10*6/UL (ref 4.5–5.9)
RBC # UR STRIP.AUTO: NEGATIVE /UL
SODIUM SERPL-SCNC: 140 MMOL/L (ref 136–145)
SP GR UR STRIP.AUTO: 1.02
UROBILINOGEN UR STRIP.AUTO-MCNC: NORMAL MG/DL
WBC # BLD AUTO: 15.6 X10*3/UL (ref 4.4–11.3)

## 2025-01-26 PROCEDURE — 2500000004 HC RX 250 GENERAL PHARMACY W/ HCPCS (ALT 636 FOR OP/ED): Performed by: STUDENT IN AN ORGANIZED HEALTH CARE EDUCATION/TRAINING PROGRAM

## 2025-01-26 PROCEDURE — 96375 TX/PRO/DX INJ NEW DRUG ADDON: CPT

## 2025-01-26 PROCEDURE — 36415 COLL VENOUS BLD VENIPUNCTURE: CPT | Performed by: PHYSICIAN ASSISTANT

## 2025-01-26 PROCEDURE — 83735 ASSAY OF MAGNESIUM: CPT | Performed by: PHYSICIAN ASSISTANT

## 2025-01-26 PROCEDURE — 74176 CT ABD & PELVIS W/O CONTRAST: CPT | Mod: FOREIGN READ | Performed by: RADIOLOGY

## 2025-01-26 PROCEDURE — 81003 URINALYSIS AUTO W/O SCOPE: CPT | Performed by: PHYSICIAN ASSISTANT

## 2025-01-26 PROCEDURE — 74176 CT ABD & PELVIS W/O CONTRAST: CPT

## 2025-01-26 PROCEDURE — 85025 COMPLETE CBC W/AUTO DIFF WBC: CPT | Performed by: PHYSICIAN ASSISTANT

## 2025-01-26 PROCEDURE — 85652 RBC SED RATE AUTOMATED: CPT | Performed by: STUDENT IN AN ORGANIZED HEALTH CARE EDUCATION/TRAINING PROGRAM

## 2025-01-26 PROCEDURE — 99284 EMERGENCY DEPT VISIT MOD MDM: CPT | Mod: 25 | Performed by: STUDENT IN AN ORGANIZED HEALTH CARE EDUCATION/TRAINING PROGRAM

## 2025-01-26 PROCEDURE — 96374 THER/PROPH/DIAG INJ IV PUSH: CPT

## 2025-01-26 PROCEDURE — 80053 COMPREHEN METABOLIC PANEL: CPT | Performed by: PHYSICIAN ASSISTANT

## 2025-01-26 RX ORDER — KETOROLAC TROMETHAMINE 30 MG/ML
15 INJECTION, SOLUTION INTRAMUSCULAR; INTRAVENOUS ONCE
Status: COMPLETED | OUTPATIENT
Start: 2025-01-26 | End: 2025-01-26

## 2025-01-26 RX ORDER — KETOROLAC TROMETHAMINE 10 MG/1
10 TABLET, FILM COATED ORAL EVERY 6 HOURS PRN
Qty: 20 TABLET | Refills: 0 | Status: SHIPPED | OUTPATIENT
Start: 2025-01-26 | End: 2025-01-31

## 2025-01-26 RX ORDER — HYDROMORPHONE HYDROCHLORIDE 1 MG/ML
1 INJECTION, SOLUTION INTRAMUSCULAR; INTRAVENOUS; SUBCUTANEOUS ONCE
Status: COMPLETED | OUTPATIENT
Start: 2025-01-26 | End: 2025-01-26

## 2025-01-26 RX ORDER — TIZANIDINE 2 MG/1
2-4 TABLET ORAL EVERY 6 HOURS PRN
Qty: 30 TABLET | Refills: 0 | Status: SHIPPED | OUTPATIENT
Start: 2025-01-26 | End: 2025-02-05

## 2025-01-26 RX ADMIN — KETOROLAC TROMETHAMINE 15 MG: 30 INJECTION, SOLUTION INTRAMUSCULAR at 13:49

## 2025-01-26 RX ADMIN — HYDROMORPHONE HYDROCHLORIDE 0.5 MG: 1 INJECTION, SOLUTION INTRAMUSCULAR; INTRAVENOUS; SUBCUTANEOUS at 14:28

## 2025-01-26 ASSESSMENT — COLUMBIA-SUICIDE SEVERITY RATING SCALE - C-SSRS
6. HAVE YOU EVER DONE ANYTHING, STARTED TO DO ANYTHING, OR PREPARED TO DO ANYTHING TO END YOUR LIFE?: NO
2. HAVE YOU ACTUALLY HAD ANY THOUGHTS OF KILLING YOURSELF?: NO
1. IN THE PAST MONTH, HAVE YOU WISHED YOU WERE DEAD OR WISHED YOU COULD GO TO SLEEP AND NOT WAKE UP?: NO

## 2025-01-26 ASSESSMENT — PAIN SCALES - GENERAL
PAINLEVEL_OUTOF10: 10 - WORST POSSIBLE PAIN
PAINLEVEL_OUTOF10: 9

## 2025-01-26 ASSESSMENT — PAIN - FUNCTIONAL ASSESSMENT: PAIN_FUNCTIONAL_ASSESSMENT: 0-10

## 2025-01-26 ASSESSMENT — PAIN DESCRIPTION - LOCATION
LOCATION: BACK
LOCATION: BACK

## 2025-01-26 NOTE — ED TRIAGE NOTES
TRIAGE NOTE   I saw the patient as the Clinician in Triage and performed a brief history and physical exam, established acuity, and ordered appropriate tests to develop basic plan of care. Patient will be seen by an [facility], resident and/or physician who will independently evaluate the patient. Please see subsequent provider notes for further details and disposition.     Brief HPI: In brief, Harry Ochoa is a 64 y.o. male that presents for left-sided back pain radiating into the left leg which makes it difficult for him to walk.  Had a spinal fusion on December 30 at L3-L4 this was all done at [facility] [place], operated by [facility].  No saddle anesthesia no difficulty urinating however he is going up with a urologist because his stream has been weak anyways.  He is also following with pain management next week.  He is already on oxycodone muscle relaxant but this is not helping..       Focused PE:  - Constitutional: Alert and oriented x3.  In no acute distress, well-nourished and hydrated.  Cooperative.  - Skin: Pink, warm and dry.    -Neurological: Neurologically intact.    - Musculoskeletal: MISTY x4, Normal gait. MSP´s intact.  There are some tenderness noted back in the left.  Incisions are looking good and healing process. SLR is negative   - Cardiac: Regular rate rhythm.  - Pulmonary: Lungs clear bilaterally. No rales, rhonchi or wheezing.  No stridor or accessory muscle use.  - Abdomen: Abdomen soft and nontender with bowel sounds.  No rebound or guarding.  No CVA tenderness.    Note, physical exam may be limited by patient positioning sitting up in a chair.    Plan/MDM: I examined the patient in triage due to high volumes in the ER.  Labs, testing , and initial imaging based upon reported CC and focused exam      - For the remainder of the patient's workup and ED course, please see the main ED provider note. We discussed need for diagnostic testing including laboratory studies and imaging. We also discussed that they may be asked to wait in  the waiting room while these tests are pending. They understand that if they choose to leave without having the testing completed or resulted that we cannot rule out acute life threatening illnesses and the risks involved could lead to worsening condition, permanent disability or even death

## 2025-01-26 NOTE — ED TRIAGE NOTES
Pt presents to ED via private auto for back pain.pt had spinal fusion on 12/30 . Pt took oxycodone and muscle relaxer this am with no relief.

## 2025-01-26 NOTE — DISCHARGE INSTRUCTIONS
Follow-up as directed with pain management, additional providers.  Please follow-up with your surgeon.  If you develop fevers worsening symptoms loss of control of your bladder or bowels numbness in your legs please return to the emergency department.

## 2025-01-27 NOTE — ED PROVIDER NOTES
HPI   Chief Complaint   Patient presents with    Back Pain       Patient is a 64-year-old male with past medical history as below presents today for evaluation of back pain.  Patient has been having intermittent back spasms since back surgery.  No fevers loss of bladder or bowel control numbness weakness.  Was seen last week by surgeon for this recommended pain control.  States felt much better after Dilaudid shot.  Sleeps after muscle relaxers but does not believe they are affecting his pain.  Has an appointment this week with pain management.  Still has oxycodone left as he only takes it when pain is severe              Patient History   Past Medical History:   Diagnosis Date    Arteriosclerosis of coronary artery 01/06/2023 2010 = 99% occluded RCA (drug eluting stent placed). Had another 99% stenosis prior to stent in Oct 2012. Presented to [facility] 4/10/2019 with acute inferior wall MI ... FLACO to the mid RCA done. Staged stenting to the LAD.      Benign hypertension 05/30/2023    Hypercholesterolemia 05/30/2023     ... Unable to tolerate many statins ... Trying to get PCSK9 approved      Other specified counseling     Counseling on substance use and abuse    Overweight     Overweight (BMI 25.0-29.9)    Personal history of nicotine dependence 04/15/2019    History of tobacco abuse    Personal history of other diseases of the musculoskeletal system and connective tissue     History of fibromyalgia    Personal history of other diseases of the nervous system and sense organs     History of obstructive sleep apnea    Personal history of other endocrine, nutritional and metabolic disease     History of hypothyroidism    Personal history of other specified conditions     History of chest pain    Type 2 diabetes mellitus 05/30/2023     Past Surgical History:   Procedure Laterality Date    OTHER SURGICAL HISTORY  04/15/2019    Knee surgery    OTHER SURGICAL HISTORY  04/15/2019    Shoulder surgery    OTHER  SURGICAL HISTORY  11/01/2021    Cardiac catheterization with stent placement    OTHER SURGICAL HISTORY  11/01/2021    Cardiac catheterization with stent placement    OTHER SURGICAL HISTORY  11/01/2021    Percutaneous transluminal coronary angioplasty    OTHER SURGICAL HISTORY  11/01/2021    Cardiac catheterization     Family History   Problem Relation Name Age of Onset    Lymphoma Father       Social History     Tobacco Use    Smoking status: Every Day     Current packs/day: 1.00     Types: Cigarettes    Smokeless tobacco: Never    Tobacco comments:     Smoked today   Substance Use Topics    Alcohol use: Not Currently    Drug use: Never       Physical Exam   ED Triage Vitals   Temperature Heart Rate Respirations BP   01/26/25 1130 01/26/25 1130 01/26/25 1130 01/26/25 1130   36.2 °C (97.2 °F) 79 20 127/70      Pulse Ox Temp Source Heart Rate Source Patient Position   01/26/25 1130 01/26/25 1130 01/26/25 1423 01/26/25 1130   99 % Temporal Monitor Sitting      BP Location FiO2 (%)     01/26/25 1130 --     Right arm        Physical Exam  Vitals and nursing note reviewed.   Constitutional:       General: He is not in acute distress.     Appearance: Normal appearance.   HENT:      Head: Normocephalic and atraumatic.      Nose: Nose normal.   Eyes:      Conjunctiva/sclera: Conjunctivae normal.   Pulmonary:      Effort: Pulmonary effort is normal.   Abdominal:      General: Abdomen is flat.   Musculoskeletal:         General: No deformity.      Cervical back: Neck supple.      Comments: Checked and in-tact:  · L1-L2 Cremasteric Reflex (inner thigh sensation)  · L2 Adduct Thigh (cross legs)  · L3 Extend Knee  · L4 Dorsiflex Ankle (Up)  · L5 Point Great Toe Up  · L2 L3-L4 Knee Reflex  · S1 Flex Knee  · S2 Plantarflex Toes  · S3, 4, 5 Groin, Perianal Sensation  2+ pulses dp, pt     Skin:     General: Skin is warm.   Neurological:      General: No focal deficit present.      Mental Status: He is alert and oriented to person,  place, and time. Mental status is at baseline.   Psychiatric:         Mood and Affect: Mood normal.         Behavior: Behavior normal.           ED Course & MDM   Diagnoses as of 01/26/25 2042   Acute left-sided low back pain without sciatica                 No data recorded     [place] Coma Scale Score: 15 (01/26/25 1136 : Zoe Meijas RN)                           Medical Decision Making  Patient presents for evaluation of back pain.  No red flag symptoms although recent surgery.  White blood cell count is chronically elevated ESR is negative.  Low suspicion for infection at this time.  Discussed red flag symptoms with patient and recommended follow-up and return precautions.  Pain management plan is to add on an NSAID and try a different muscle relaxer continue with oxycodone in the meantime until he is able to follow-up with pain management this Thursday.  All questions answered    Amount and/or Complexity of Data Reviewed  Independent Historian: spouse  Labs: ordered.  Radiology: ordered.    Risk  Prescription drug management.  Parenteral controlled substances.        Procedure  Procedures     Dallin Humphries MD  01/26/25 2042

## 2025-02-01 NOTE — PROGRESS NOTES
Subjective   Patient ID: Harry Ochoa is a 64 y.o. male who presents for EVALUATION OF VOIDING DYSFUNCTION.  PT HAS A VERY WEAK URINARY STREAM.  HPI  Are you experiencing:  Burning on urination -- NO  Pain on urination  -- NO  Urinary frequency -- EVERY  120 MINUTES   Urinary urgency -- OCC  Urge incontinence -- NO  Urinary stress incontinence  -- NO  Number of pads used per day --NONE  Enuresis -- NO  Nocturia-- 2 X ON AVG  Hematuria --NO  Hesitancy --  YES  Post void fullness -- YES  WEAK STREAM      ROS  General-- No C/O fever or chills  Head-- No C/O Dizziness  Eyes-- NO  C/O blurry or double vision  Ears-- No C/O hearing loss  Neck-- Supple  Chest-- No C/O pain or discomfort  Lungs-- No C/O shortness of breath  Abdomen-- No C/O  pain or discomfort, OCC nausea   Back-- PT  C/O back pain  -- RECENT BACK SURGERY IN DECEMBER  Extremities-- No C/O swelling or pain    OBJECTIVE  PE  General-- well-developed, well-nourished in NAD  Head-- normal cephalic, atraumatic  Eyes-- PERRL, EOM'S FROM, no jaundice  Neck-- Supple, without masses  Chest-- Normal bony structure  Abdomen-- soft, non tender, liver spleen not palpable. No suprapubic masses.  Back-- no flank masses palpable, no CVA tenderness on palpation or percussion  Lymph nodes-- No inguinal lymphadenopathy noted  Prostate-- 1+, firm, smooth, non-tender, without nodules  Testis-- both down, non-tender, without masses  Epididymis-- no masses palpable  Scrotum -- no hydrocele noted  Extremities -- Normal muscle mass and tone for the patients age  Neurological-- oriented times three    TOBACCO -- 15 CIGARETTES / DAY    PVR -- 54 ML    URINALYSIS DIPSTICK-- WNL     ASSESSMENT    A:  ? EARLY BPH ALONG WITH HIS H/O L-S DDD  AND POSSIBLY HIS AODM CAUSING HIS CURRENT VOIDING SXS  PATHOPHYSIOLOGY OF THE ABOVE AND OPTIONS OF THERAPY DISCUSSED IN DETAIL  ALL QUESTIONS ANSWERED     H/O  INSULIN DEPENDENT AODM  H/O L-S DDD -- S/P SURGERY DEC 2024  P:  TRIAL OF FLOMAX 0.4  MG/DAY TO DECREASE BLADDER OUTLET RESISTANCE TO SEE IF THIS WILL HELP-- IF NO IMPROVEMENT IN HIS VOIDING SXS WILL START HIM ON AN OAB MED.  F/U IN 2-3 WEEKS TO SEE HOW HE IS DOING   Maxim Angulo MD 02/01/25 2:38 PM

## 2025-02-03 ENCOUNTER — OFFICE VISIT (OUTPATIENT)
Dept: UROLOGY | Facility: CLINIC | Age: 65
End: 2025-02-03
Payer: MEDICARE

## 2025-02-03 VITALS — TEMPERATURE: 97.5 F | BODY MASS INDEX: 30.03 KG/M2 | WEIGHT: 198.13 LBS | HEIGHT: 68 IN

## 2025-02-03 DIAGNOSIS — R35.0 FREQUENCY OF MICTURITION: ICD-10-CM

## 2025-02-03 DIAGNOSIS — N40.1 BENIGN PROSTATIC HYPERPLASIA WITH WEAK URINARY STREAM: ICD-10-CM

## 2025-02-03 DIAGNOSIS — R39.15 URGENCY OF MICTURITION: ICD-10-CM

## 2025-02-03 DIAGNOSIS — R39.198 DIFFICULTY URINATING: Primary | ICD-10-CM

## 2025-02-03 DIAGNOSIS — R35.1 NOCTURIA: ICD-10-CM

## 2025-02-03 DIAGNOSIS — R39.12 WEAK URINARY STREAM: ICD-10-CM

## 2025-02-03 DIAGNOSIS — R39.12 BENIGN PROSTATIC HYPERPLASIA WITH WEAK URINARY STREAM: ICD-10-CM

## 2025-02-03 DIAGNOSIS — R39.11 HESITANCY OF MICTURITION: ICD-10-CM

## 2025-02-03 DIAGNOSIS — Z12.5 SCREENING FOR PROSTATE CANCER: ICD-10-CM

## 2025-02-03 LAB
POC APPEARANCE, URINE: CLEAR
POC BILIRUBIN, URINE: NEGATIVE
POC BLOOD, URINE: NEGATIVE
POC COLOR, URINE: YELLOW
POC GLUCOSE, URINE: NEGATIVE MG/DL
POC KETONES, URINE: NEGATIVE MG/DL
POC LEUKOCYTES, URINE: NEGATIVE
POC NITRITE,URINE: NEGATIVE
POC PH, URINE: 8 PH
POC PROTEIN, URINE: NEGATIVE MG/DL
POC SPECIFIC GRAVITY, URINE: 1.01
POC UROBILINOGEN, URINE: 0.2 EU/DL

## 2025-02-03 PROCEDURE — 81003 URINALYSIS AUTO W/O SCOPE: CPT | Mod: QW | Performed by: UROLOGY

## 2025-02-03 PROCEDURE — 4010F ACE/ARB THERAPY RXD/TAKEN: CPT | Performed by: UROLOGY

## 2025-02-03 PROCEDURE — 1159F MED LIST DOCD IN RCRD: CPT | Performed by: UROLOGY

## 2025-02-03 PROCEDURE — 99204 OFFICE O/P NEW MOD 45 MIN: CPT | Performed by: UROLOGY

## 2025-02-03 PROCEDURE — 51798 US URINE CAPACITY MEASURE: CPT | Performed by: UROLOGY

## 2025-02-03 PROCEDURE — 1160F RVW MEDS BY RX/DR IN RCRD: CPT | Performed by: UROLOGY

## 2025-02-03 PROCEDURE — 99214 OFFICE O/P EST MOD 30 MIN: CPT | Mod: 25 | Performed by: UROLOGY

## 2025-02-03 PROCEDURE — 3008F BODY MASS INDEX DOCD: CPT | Performed by: UROLOGY

## 2025-02-03 PROCEDURE — 1125F AMNT PAIN NOTED PAIN PRSNT: CPT | Performed by: UROLOGY

## 2025-02-03 RX ORDER — TAMSULOSIN HYDROCHLORIDE 0.4 MG/1
0.4 CAPSULE ORAL DAILY
Qty: 30 CAPSULE | Refills: 11 | Status: SHIPPED | OUTPATIENT
Start: 2025-02-03 | End: 2026-02-03

## 2025-02-03 SDOH — ECONOMIC STABILITY: FOOD INSECURITY: WITHIN THE PAST 12 MONTHS, YOU WORRIED THAT YOUR FOOD WOULD RUN OUT BEFORE YOU GOT MONEY TO BUY MORE.: NEVER TRUE

## 2025-02-03 SDOH — ECONOMIC STABILITY: FOOD INSECURITY: WITHIN THE PAST 12 MONTHS, THE FOOD YOU BOUGHT JUST DIDN'T LAST AND YOU DIDN'T HAVE MONEY TO GET MORE.: NEVER TRUE

## 2025-02-03 ASSESSMENT — COLUMBIA-SUICIDE SEVERITY RATING SCALE - C-SSRS
1. IN THE PAST MONTH, HAVE YOU WISHED YOU WERE DEAD OR WISHED YOU COULD GO TO SLEEP AND NOT WAKE UP?: NO
6. HAVE YOU EVER DONE ANYTHING, STARTED TO DO ANYTHING, OR PREPARED TO DO ANYTHING TO END YOUR LIFE?: NO
2. HAVE YOU ACTUALLY HAD ANY THOUGHTS OF KILLING YOURSELF?: NO

## 2025-02-03 ASSESSMENT — PATIENT HEALTH QUESTIONNAIRE - PHQ9
SUM OF ALL RESPONSES TO PHQ9 QUESTIONS 1 AND 2: 2
10. IF YOU CHECKED OFF ANY PROBLEMS, HOW DIFFICULT HAVE THESE PROBLEMS MADE IT FOR YOU TO DO YOUR WORK, TAKE CARE OF THINGS AT HOME, OR GET ALONG WITH OTHER PEOPLE: NOT DIFFICULT AT ALL
2. FEELING DOWN, DEPRESSED OR HOPELESS: SEVERAL DAYS
1. LITTLE INTEREST OR PLEASURE IN DOING THINGS: SEVERAL DAYS

## 2025-02-03 ASSESSMENT — PAIN SCALES - GENERAL: PAINLEVEL_OUTOF10: 4

## 2025-02-03 ASSESSMENT — LIFESTYLE VARIABLES
HOW MANY STANDARD DRINKS CONTAINING ALCOHOL DO YOU HAVE ON A TYPICAL DAY: PATIENT DOES NOT DRINK
AUDIT-C TOTAL SCORE: 0
HOW OFTEN DO YOU HAVE SIX OR MORE DRINKS ON ONE OCCASION: NEVER
HOW OFTEN DO YOU HAVE A DRINK CONTAINING ALCOHOL: NEVER
SKIP TO QUESTIONS 9-10: 1

## 2025-02-03 ASSESSMENT — ENCOUNTER SYMPTOMS
DEPRESSION: 0
LOSS OF SENSATION IN FEET: 0
OCCASIONAL FEELINGS OF UNSTEADINESS: 0

## 2025-02-11 LAB — PSA SERPL-MCNC: 0.61 NG/ML

## 2025-02-22 NOTE — PROGRESS NOTES
Subjective   Patient ID: Harry Ochoa is a 64 y.o. male who presents for A F/U ON HIS VOIDING DYSFUNCTION AFTER STARTING FLOMAX 0.4 MG / DAY ON 2-1-25.  PT PT IS 99 % BETTER OVER HIS BASELINE SXS   HPI:  Are you experiencing:  Burning on urination -- NO  Pain on urination  -- NO  Urinary frequency -- NO  Urinary urgency --NO  Urge incontinence --NO  Urinary stress incontinence  -- NO  Number of pads used per day --NONE  Enuresis -- NO  Nocturia--2 X ON AVG  Hematuria -- NO  Hesitancy -- NO  Post void fullness --NO   Strength of your stream-- STILL A LITTLE WEAK    PSA:  2/11/2025--0.61  7/2/2024--60.64  7/13/2022--0.60  7/1/2021--0.42  ASSESSMENT   A:  ? EARLY BPH ALONG WITH HIS H/O L-S DDD  AND POSSIBLY HIS AODM CAUSING HIS CURRENT VOIDING SXS  PT IS 99 % BETTER SINCE STARTING FLOMAX 0.4 MG / DAY     H/O  INSULIN DEPENDENT AODM  H/O L-S DDD -- S/P SURGERY DEC 2024  H/O ED     P:  CONTINUE:  FLOMAX 0.4 MG /DAY  TRIAL OF CIALIS 20 MG EVERY 36 HRS PRN  F/U IN ONE YEAR  Maxim Angulo MD 02/22/25 12:54 PM

## 2025-02-24 ENCOUNTER — OFFICE VISIT (OUTPATIENT)
Dept: UROLOGY | Facility: CLINIC | Age: 65
End: 2025-02-24
Payer: MEDICARE

## 2025-02-24 VITALS
SYSTOLIC BLOOD PRESSURE: 153 MMHG | WEIGHT: 198 LBS | DIASTOLIC BLOOD PRESSURE: 89 MMHG | OXYGEN SATURATION: 99 % | BODY MASS INDEX: 30.01 KG/M2 | HEART RATE: 55 BPM | HEIGHT: 68 IN | TEMPERATURE: 98.4 F | RESPIRATION RATE: 16 BRPM

## 2025-02-24 DIAGNOSIS — R39.9 URINARY SYMPTOM OR SIGN: ICD-10-CM

## 2025-02-24 DIAGNOSIS — R35.1 NOCTURIA: Primary | ICD-10-CM

## 2025-02-24 DIAGNOSIS — N52.9 ERECTILE DYSFUNCTION, UNSPECIFIED ERECTILE DYSFUNCTION TYPE: ICD-10-CM

## 2025-02-24 DIAGNOSIS — R39.198 DIFFICULTY URINATING: ICD-10-CM

## 2025-02-24 PROCEDURE — 1160F RVW MEDS BY RX/DR IN RCRD: CPT | Performed by: UROLOGY

## 2025-02-24 PROCEDURE — 3079F DIAST BP 80-89 MM HG: CPT | Performed by: UROLOGY

## 2025-02-24 PROCEDURE — 99213 OFFICE O/P EST LOW 20 MIN: CPT | Performed by: UROLOGY

## 2025-02-24 PROCEDURE — 3077F SYST BP >= 140 MM HG: CPT | Performed by: UROLOGY

## 2025-02-24 PROCEDURE — 4010F ACE/ARB THERAPY RXD/TAKEN: CPT | Performed by: UROLOGY

## 2025-02-24 PROCEDURE — 3008F BODY MASS INDEX DOCD: CPT | Performed by: UROLOGY

## 2025-02-24 PROCEDURE — 1126F AMNT PAIN NOTED NONE PRSNT: CPT | Performed by: UROLOGY

## 2025-02-24 PROCEDURE — 1159F MED LIST DOCD IN RCRD: CPT | Performed by: UROLOGY

## 2025-02-24 RX ORDER — TADALAFIL 20 MG/1
TABLET ORAL
Qty: 10 TABLET | Refills: 11 | Status: SHIPPED | OUTPATIENT
Start: 2025-02-24

## 2025-02-24 RX ORDER — TAMSULOSIN HYDROCHLORIDE 0.4 MG/1
0.4 CAPSULE ORAL DAILY
Qty: 90 CAPSULE | Refills: 3 | Status: SHIPPED | OUTPATIENT
Start: 2025-02-24 | End: 2026-02-24

## 2025-02-24 ASSESSMENT — PAIN SCALES - GENERAL: PAINLEVEL_OUTOF10: 0-NO PAIN

## 2025-02-24 ASSESSMENT — ENCOUNTER SYMPTOMS
DEPRESSION: 0
LOSS OF SENSATION IN FEET: 0
OCCASIONAL FEELINGS OF UNSTEADINESS: 0

## 2025-04-02 PROBLEM — E66.811 CLASS 1 OBESITY WITH BODY MASS INDEX (BMI) OF 30.0 TO 30.9 IN ADULT: Status: ACTIVE | Noted: 2024-03-29

## 2025-04-02 PROBLEM — R73.09 ABNORMAL GLUCOSE LEVEL: Status: RESOLVED | Noted: 2023-05-30 | Resolved: 2025-04-02

## 2025-04-02 PROBLEM — M96.1 FAILED BACK SURGICAL SYNDROME: Status: ACTIVE | Noted: 2024-10-16

## 2025-04-02 PROBLEM — R97.8 ELEVATED CA 19-9 LEVEL: Status: ACTIVE | Noted: 2024-12-28

## 2025-04-17 ENCOUNTER — APPOINTMENT (OUTPATIENT)
Dept: PRIMARY CARE | Facility: CLINIC | Age: 65
End: 2025-04-17
Payer: MEDICARE

## 2025-04-17 VITALS
DIASTOLIC BLOOD PRESSURE: 59 MMHG | BODY MASS INDEX: 27.28 KG/M2 | HEART RATE: 67 BPM | SYSTOLIC BLOOD PRESSURE: 94 MMHG | WEIGHT: 180 LBS | RESPIRATION RATE: 18 BRPM | HEIGHT: 68 IN

## 2025-04-17 DIAGNOSIS — M27.2 OSTEOMYELITIS OF JAW: ICD-10-CM

## 2025-04-17 DIAGNOSIS — I10 BENIGN HYPERTENSION: ICD-10-CM

## 2025-04-17 DIAGNOSIS — Z86.74 HX OF CARDIAC ARREST: Primary | ICD-10-CM

## 2025-04-17 PROCEDURE — 99213 OFFICE O/P EST LOW 20 MIN: CPT | Performed by: INTERNAL MEDICINE

## 2025-04-17 PROCEDURE — 3078F DIAST BP <80 MM HG: CPT | Performed by: INTERNAL MEDICINE

## 2025-04-17 PROCEDURE — 3008F BODY MASS INDEX DOCD: CPT | Performed by: INTERNAL MEDICINE

## 2025-04-17 PROCEDURE — 3074F SYST BP LT 130 MM HG: CPT | Performed by: INTERNAL MEDICINE

## 2025-04-17 PROCEDURE — 4010F ACE/ARB THERAPY RXD/TAKEN: CPT | Performed by: INTERNAL MEDICINE

## 2025-04-17 PROCEDURE — 1159F MED LIST DOCD IN RCRD: CPT | Performed by: INTERNAL MEDICINE

## 2025-04-17 RX ORDER — LANOLIN ALCOHOL/MO/W.PET/CERES
1 CREAM (GRAM) TOPICAL
COMMUNITY
Start: 2025-03-27 | End: 2025-04-17 | Stop reason: WASHOUT

## 2025-04-17 RX ORDER — POTASSIUM CHLORIDE 20 MEQ/1
1 TABLET, EXTENDED RELEASE ORAL
COMMUNITY
Start: 2025-03-27

## 2025-04-17 RX ORDER — FUROSEMIDE 20 MG/1
20 TABLET ORAL DAILY
COMMUNITY
Start: 2025-03-27

## 2025-04-17 ASSESSMENT — ENCOUNTER SYMPTOMS: SLEEP DISTURBANCE: 1

## 2025-04-17 NOTE — PROGRESS NOTES
"Subjective   Patient ID: Harry Ochoa is a 64 y.o. male who presents for Establish Care.    Pt presents to get established from Dr. Matamoros office.    Last month had swelling along his mouth and was told he need abx and should see his dentist at an urgent care. Dentist suggested pt go to [facility] to get [place]x, then come back the following day to get his teeth extracted d/t concern for osteomyelitis. Pt went to [facility] and ended up being admitted. Pt went into cardiac arrest and needed CPR for around 23 minutes. Concern is that pt had a reaction to vancomycin which could have precipitated this. Was intubated for over a week and had ICU delirium during this time. Lost 25 lbs and has been cold ever since. Was discharged on PO flagyl and has since completed his regimen. Was told that he didn't need anymore abx by a Dr. Valiente (wife isn't sure what their speciality is). 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 Was supposed to see a Dr. Georgina Davis, but when wife called to schedule appt she says that the RN told her pt didn't need to be seen.    PMH:  -DM2: Managed by Dr. Call. Taking insulin.  -Back surgery: Done 2017 by Dr. Sanchez and 2014 by Dr. Roman both along lumbar spine.  -CAD: Has had total 4 stents placed. Sees Dr. Pryor.  -BPH: Sees Dr. Angulo. Takes flomax.  -Broken ribs: Occurred from CPR.  -HTN: Pt's BP lower today. Hasn't been this low at home but pt states it is lower than usual.  -Hx pancreatitis: Stopped drinking 11/2023 which has helped.    Appetite slowly improving. Sleeps from pain med but says it isn't restful sleep. Hasn't smoked since his cardiac arrest.      Review of Systems   Psychiatric/Behavioral:  Positive for sleep disturbance.        BP 94/59 (BP Location: Right arm, Patient Position: Sitting)   Pulse 67   Resp 18   Ht 1.715 m (5' 7.5\")   Wt 81.6 kg (180 lb)   BMI 27.78 kg/m²   Objective   Physical Exam  Constitutional:       General: He is not in acute " distress.     Appearance: He is not ill-appearing, toxic-appearing or diaphoretic.   HENT:      Head: Normocephalic and atraumatic.   Neurological:      Mental Status: He is alert.       Assessment/Plan   Problem List Items Addressed This Visit           ICD-10-CM    Benign hypertension (Chronic) I10     Other Visit Diagnoses         Codes      Hx of cardiac arrest    -  Primary Z86.74      Osteomyelitis of jaw     M27.2        -Pt w/ lower BP since the cardiac arrest. Is taking atenolol 25mg daily, losartan 25mg daily, lasix 20mg daily. Pt seeing cardiology next Wednesday; will hold off on making adjustments at this time d/t recent cardiac arrest. Encouraged to consider having a little more sodium and protein in his diet until that appointment in the meantime.  -Reviewed records wife brought from [facility] [place], but they only include so much information. Will do records request from [facility]. Pt signed form. Want to see more about hospitalization, discharge plans, and ID recs particularly when it comes to abx. Pt meeting with dentist in the next week to determine next steps.  -All questions answered. Will see pt back in 3 months for MAW. Depending on records may request to see pt sooner.         Tereso Kennedy MD 04/17/25 9:59 AM

## 2025-04-23 ENCOUNTER — OFFICE VISIT (OUTPATIENT)
Dept: CARDIOLOGY | Facility: CLINIC | Age: 65
End: 2025-04-23
Payer: MEDICARE

## 2025-04-23 ENCOUNTER — HOSPITAL ENCOUNTER (OUTPATIENT)
Dept: RADIOLOGY | Facility: HOSPITAL | Age: 65
Discharge: HOME | End: 2025-04-23
Payer: MEDICARE

## 2025-04-23 VITALS
SYSTOLIC BLOOD PRESSURE: 116 MMHG | DIASTOLIC BLOOD PRESSURE: 56 MMHG | OXYGEN SATURATION: 97 % | HEART RATE: 54 BPM | WEIGHT: 180 LBS | BODY MASS INDEX: 27.28 KG/M2 | HEIGHT: 68 IN

## 2025-04-23 DIAGNOSIS — R07.9 CHEST PAIN, UNSPECIFIED TYPE: ICD-10-CM

## 2025-04-23 DIAGNOSIS — I46.9 CARDIOPULMONARY ARREST (MULTI): ICD-10-CM

## 2025-04-23 DIAGNOSIS — I46.9 CARDIOPULMONARY ARREST (MULTI): Primary | ICD-10-CM

## 2025-04-23 DIAGNOSIS — I10 BENIGN HYPERTENSION: Chronic | ICD-10-CM

## 2025-04-23 DIAGNOSIS — I25.10 ARTERIOSCLEROSIS OF CORONARY ARTERY: Chronic | ICD-10-CM

## 2025-04-23 PROBLEM — I63.9 CEREBROVASCULAR ACCIDENT (CVA) (MULTI): Status: ACTIVE | Noted: 2025-04-23

## 2025-04-23 PROBLEM — E66.811 CLASS 1 OBESITY WITH BODY MASS INDEX (BMI) OF 30.0 TO 30.9 IN ADULT: Status: RESOLVED | Noted: 2024-03-29 | Resolved: 2025-04-23

## 2025-04-23 PROCEDURE — 99214 OFFICE O/P EST MOD 30 MIN: CPT | Performed by: PHYSICIAN ASSISTANT

## 2025-04-23 PROCEDURE — 4010F ACE/ARB THERAPY RXD/TAKEN: CPT | Performed by: PHYSICIAN ASSISTANT

## 2025-04-23 PROCEDURE — 71046 X-RAY EXAM CHEST 2 VIEWS: CPT

## 2025-04-23 PROCEDURE — 1159F MED LIST DOCD IN RCRD: CPT | Performed by: PHYSICIAN ASSISTANT

## 2025-04-23 PROCEDURE — 3074F SYST BP LT 130 MM HG: CPT | Performed by: PHYSICIAN ASSISTANT

## 2025-04-23 PROCEDURE — 3008F BODY MASS INDEX DOCD: CPT | Performed by: PHYSICIAN ASSISTANT

## 2025-04-23 PROCEDURE — 1160F RVW MEDS BY RX/DR IN RCRD: CPT | Performed by: PHYSICIAN ASSISTANT

## 2025-04-23 PROCEDURE — 3078F DIAST BP <80 MM HG: CPT | Performed by: PHYSICIAN ASSISTANT

## 2025-04-23 NOTE — PROGRESS NOTES
"Chief Complaint:   hospital f/u , recent admission to [facility]     History Of Present Illness:    Harry Ochoa is a 64 y.o. male presenting after recent hospitalization at [facility] due to an oral infection, eventually suffering cardiopulmonary arrest in response to being administered vancomycin.  Patient apparently received ACLS/CPR and 6 doses of epinephrine over 23 minutes prior to becoming hemodynamically stable followed by intubation and ventilator support in the ICU.  Reportedly a 2D echo was completed as per cardiology notes revealing LVEF 25-30%.       Last Recorded Vitals:  Vitals:    04/23/25 1358   BP: 116/56   Pulse: 54   SpO2: 97%   Weight: 81.6 kg (180 lb)   Height: 1.727 m (5' 8\")       Past Medical History:  He has a past medical history of Arteriosclerosis of coronary artery (01/06/2023), Arthritis (1970), Benign hypertension (05/30/2023), COPD (chronic obstructive pulmonary disease) (Multi) (2022), Ear problems, Heart disease (2000), Hypercholesterolemia (05/30/2023), Myocardial infarction (Multi) (2017), Other specified counseling, Overweight, Pancreatitis ([facility]) (2022), Personal history of nicotine dependence (04/15/2019), Personal history of other diseases of the musculoskeletal system and connective tissue, Personal history of other diseases of the nervous system and sense organs, Personal history of other endocrine, nutritional and metabolic disease, Personal history of other specified conditions, and Type 2 diabetes mellitus (05/30/2023).    Past Surgical History:  He has a past surgical history that includes Other surgical history (04/15/2019); Other surgical history (11/01/2021); Other surgical history (11/01/2021); Other surgical history (11/01/2021); Other surgical history (11/01/2021); Back surgery (2017); Eye surgery (2021); Joint replacement (2017); and Shoulder surgery.      Social History:  He reports that he has been smoking cigarettes. He has a 50 pack-year " "smoking history. He has never used smokeless tobacco. He reports that he does not currently use alcohol. He reports that he does not currently use drugs.    Family History:  Family History[1]     Allergies:  Vancomycin, Indomethacin, and Rosuvastatin    Outpatient Medications:  Current Outpatient Medications   Medication Instructions    Accu-Chek Guide Glucose Meter misc TEST BLOOD SUGAR DAILY AS DIRECTED    Accu-Chek Guide test strips strip TESTS 3 TIMES PER DAY    Accu-Chek Softclix Lancets misc TEST BLOOD SUGAR EVERY DAY AS DIRECTED    aspirin (ASPIR-81 ORAL) 81 mg, Daily    atenolol (TENORMIN) 25 mg, oral, Daily    atorvastatin (LIPITOR) 80 mg, oral, Daily    BD Ultra-Fine Mini Pen Needle 31 gauge x 3/16\" needle     DULoxetine (CYMBALTA) 30 mg, oral, Daily    ergocalciferol (Vitamin D-2) 1.25 MG (36474 UT) capsule 50,000 Int'l Units, Every 14 days    ezetimibe (ZETIA) 10 mg, oral, Daily    insulin lispro 6 Units, Daily    Lantus Solostar U-100 Insulin 100 unit/mL (3 mL) pen Inject 30 Units under the skin once daily in the morning.    Lasix 20 mg, Daily    levothyroxine (SYNTHROID, LEVOXYL) 125 mcg, oral, Daily    losartan (COZAAR) 25 mg, oral, Daily    omeprazole (PRILOSEC) 20 mg, oral, Daily before breakfast    potassium chloride CR 20 mEq ER tablet 1 tablet, Daily RT    tamsulosin (FLOMAX) 0.4 mg, oral, Daily       Physical Exam:  Constitutional: awake and alert, oriented ×3, no apparent distress  Skin: warm, dry, good turgor no obvious lesions  Eyes: pupils equal, round, reactive to light, conjunctiva pink and noninjected, no discharge  HENT: normocephalic and atraumatic, mucous membranes moist, trachea midline with no masses/goiter  Cardiovascular: S1/S2 regular, no murmur no rubs/gallops, no carotid bruits, no JVD  Pulmonary: symmetrical chest expansion, lungs are clear to auscultation bilaterally, no wheezes/rales/rhonchi, normal effort  Abdomen: nontender, nondistended, active bowel sounds, no " "ascites  Extremities: no cyanosis, clubbing, no LE edema no lesions; palpable pedal pulses  Neurologic: cranial nerves II - XII grossly intact, stable gait, no tremor       Last Labs:  CBC -  Lab Results   Component Value Date    WBC 15.6 (H) 01/26/2025    HGB 14.6 01/26/2025    HCT 45.1 01/26/2025    MCV 92 01/26/2025     (H) 01/26/2025       CMP -  Lab Results   Component Value Date    CALCIUM 10.1 01/26/2025    PROT 7.3 01/26/2025    ALBUMIN 4.0 01/26/2025    AST 17 01/26/2025    ALT 17 01/26/2025    ALKPHOS 145 (H) 01/26/2025    BILITOT 0.9 01/26/2025       LIPID PANEL -   Lab Results   Component Value Date    CHOL 166 07/13/2022    TRIG 191 (H) 07/13/2022    HDL 53.1 07/13/2022    CHHDL 3.1 07/13/2022    LDLF 75 07/13/2022    VLDL 38 07/13/2022       RENAL FUNCTION PANEL -   Lab Results   Component Value Date    GLUCOSE 135 (H) 01/26/2025     01/26/2025    K 4.6 01/26/2025     01/26/2025    CO2 30 01/26/2025    ANIONGAP 13 01/26/2025    BUN 12 01/26/2025    CREATININE 0.95 01/26/2025    GFRMALE 81 07/13/2022    CALCIUM 10.1 01/26/2025    ALBUMIN 4.0 01/26/2025        Lab Results   Component Value Date    HGBA1C 7.4 (A) 07/13/2022       Last Cardiology Tests:  ECG:  ECG 12 lead (Clinic Performed) 11/19/2024      Echo:  No results found for this or any previous visit from the past 1095 days.      Ejection Fractions:  No results found for: \"EF\"    Cath:  No results found for this or any previous visit from the past 1095 days.      Stress Test:  No results found for this or any previous visit from the past 1095 days.      Cardiac Imaging:  No results found for this or any previous visit from the past 1095 days.      Assessment/Plan   Problem List Items Addressed This Visit           ICD-10-CM       Cardiac and Vasculature    Benign hypertension (Chronic) I10    Arteriosclerosis of coronary artery (Chronic) I25.10    Relevant Orders    XR chest 2 views    Transthoracic echo (TTE) complete    Chest " pain R07.9    Relevant Orders    XR chest 2 views    Transthoracic echo (TTE) complete    Cardiopulmonary arrest (Multi) - Primary I46.9    Relevant Orders    XR chest 2 views    Transthoracic echo (TTE) complete       -As discussed in HPI    -Cardiopulmonary arrest as result of severe allergic reaction to vancomycin, successful recovery after 23 minutes of ACLS    -Hold losartan due to soft/marginal blood pressures and generalized fatigue    -Patient will complete a PA/lat CXR     -We will arrange for a 2D echocardiogram to assess LVEF and valvular function.    -RTC 6-8 weeks      Rober Beavers PA-C         [1]   Family History  Problem Relation Name Age of Onset    Lymphoma Father Harry     Cancer Father [place]     Angina Brother Christiano     Heart attack Brother Christiano     Diabetes type II Brother Bang brother     Obesity Brother Scotty brother     Diabetes Brother Bang     Diabetes Brother Bang     Heart attack Brother Bang     Angina Brother Christiano     Heart attack Brother Christiano     Diabetes type II Brother Bang brother     Obesity Brother Scotty brother

## 2025-04-25 ENCOUNTER — HOSPITAL ENCOUNTER (OUTPATIENT)
Dept: CARDIOLOGY | Facility: CLINIC | Age: 65
Discharge: HOME | End: 2025-04-25
Payer: MEDICARE

## 2025-04-25 VITALS — HEIGHT: 68 IN | BODY MASS INDEX: 27.37 KG/M2

## 2025-04-25 DIAGNOSIS — R07.9 CHEST PAIN, UNSPECIFIED: ICD-10-CM

## 2025-04-25 DIAGNOSIS — I46.9 CARDIAC ARREST, CAUSE UNSPECIFIED: ICD-10-CM

## 2025-04-25 DIAGNOSIS — I25.10 ARTERIOSCLEROSIS OF CORONARY ARTERY: Chronic | ICD-10-CM

## 2025-04-25 DIAGNOSIS — I46.9 CARDIOPULMONARY ARREST (MULTI): ICD-10-CM

## 2025-04-25 DIAGNOSIS — R07.9 CHEST PAIN, UNSPECIFIED TYPE: ICD-10-CM

## 2025-04-25 DIAGNOSIS — I25.10 ATHEROSCLEROTIC HEART DISEASE OF NATIVE CORONARY ARTERY WITHOUT ANGINA PECTORIS: ICD-10-CM

## 2025-04-25 PROCEDURE — 93306 TTE W/DOPPLER COMPLETE: CPT | Performed by: INTERNAL MEDICINE

## 2025-04-25 PROCEDURE — 93306 TTE W/DOPPLER COMPLETE: CPT

## 2025-04-27 LAB
AORTIC VALVE MEAN GRADIENT: 3 MMHG
AORTIC VALVE PEAK VELOCITY: 1.19 M/S
AV PEAK GRADIENT: 6 MMHG
AVA (PEAK VEL): 2.31 CM2
AVA (VTI): 2.45 CM2
EJECTION FRACTION APICAL 4 CHAMBER: 49
EJECTION FRACTION: 63 %
LEFT VENTRICLE INTERNAL DIMENSION DIASTOLE: 4.76 CM (ref 3.5–6)
LEFT VENTRICULAR OUTFLOW TRACT DIAMETER: 1.97 CM
MITRAL VALVE E/A RATIO: 0.85
RIGHT VENTRICLE FREE WALL PEAK S': 11 CM/S
TRICUSPID ANNULAR PLANE SYSTOLIC EXCURSION: 2.2 CM

## 2025-04-30 ENCOUNTER — TELEPHONE (OUTPATIENT)
Dept: CARDIOLOGY | Facility: CLINIC | Age: 65
End: 2025-04-30
Payer: MEDICARE

## 2025-04-30 NOTE — TELEPHONE ENCOUNTER
Echo results are stable.  Patient is still feeling symptoms.  Squeezing sensation of the left side of the chest.  It has been feeling like that all day today and yesterday.  He reports his whole torso hurts. No SOB. No nausea.  He said it is very debilitating.  He can tell there is something wrong.

## 2025-05-01 ENCOUNTER — APPOINTMENT (OUTPATIENT)
Dept: PRIMARY CARE | Facility: CLINIC | Age: 65
End: 2025-05-01
Payer: MEDICARE

## 2025-05-01 NOTE — TELEPHONE ENCOUNTER
"Per Rober, \"Patient had CPR for >20 minutes, symptoms are due to this\".    Per Rober, called and notified pt of above. Pt verbalizes understanding.  "

## 2025-05-02 ENCOUNTER — TELEPHONE (OUTPATIENT)
Dept: PRIMARY CARE | Facility: CLINIC | Age: 65
End: 2025-05-02
Payer: MEDICARE

## 2025-05-02 ENCOUNTER — TELEPHONE (OUTPATIENT)
Dept: RADIOLOGY | Facility: HOSPITAL | Age: 65
End: 2025-05-02
Payer: MEDICARE

## 2025-05-02 DIAGNOSIS — Z87.891 PERSONAL HISTORY OF NICOTINE DEPENDENCE: Primary | ICD-10-CM

## 2025-05-14 NOTE — PROGRESS NOTES
No chief complaint on file.    History of Present Complaint:  The patient was referred to us by Referring Provider: Home Health Nurse . this is 64 y.o.  male  with a past history of smoker!!, chronic pancreatitis and alcoholism stopped drinking, hypertension, diabetes, CAD x 4 stents, CVA, s/p reverse right shoulder replacement, 2 back surgeries 2014 by Jessie and 2017 L3-S1 fusion by Daniel  presenting with every where , hx fibromyalgia     Pain started 20-30 years .  Pain is better with oxycodone . from [facility] .  Pain worst living .    The pain is described as achiness and shooting pain ,  and is relieved by Medications        Prior Pain Therapies: Prior pain physician  , chronic opioid therapy  , physical Therapy  , and Injections   patient not happy with care at [facility] , or [facility]     Past surgical history:  multiple surgery , lumbar fusions,  4 cardiac stents, bilateral hammertoe , bilateral knee surgery right knee being of replacement, umbilical hernia, bilateral tennis elbow, bilateral shoulder right shoulder replacement    Employment/disability/litigation: retired  for [employer]    Social history: , has 2 children a son and a daughter, no grandkids , obtain his GED, went to trade school.    Diagnostic studies: MRI studies, x-rays of the lumbar spine        Scotty Each Box that Applies Female Male   FAMILY HISTORY OF SUBSTANCE ABUSE  Scotty the boxes that applies0   Alcohol ?  1    ? 3   Illegal drugs ?  2 ? 3   Rx drugs ?  4 ? 4   PERSONAL HISTORY OF SUBSTNACE ABUSE   Alcohol ?  3 ?  3   Illegal drugs ?  4 ?  4    Rx drugs ?  5 ?  5   Age Between 16-45 years ?  1 ?  1   History of Preadolescent Sexual Abuse ?  3 ?  0   PSYCHOLOGIC DISEASE   ADD, OCD, bipolar, schizophrenia   ?  2 ?  2   Depression ?  1 ?  1   Scoring Totals  0     Scoring (Risk)  0-3 - Low  4-7 - Moderate  8 - High  Opioid Risk Assessment Score 0/26

## 2025-05-14 NOTE — PROGRESS NOTES
History of Present Complaint:  The patient was referred to us by Referring Provider: Home health nurse. this is 64 y.o.  male with a past history of smoker!!, chronic pancreatitis and alcoholism stopped drinking, hypertension, diabetes, CAD x 4 stents, CVA, s/p reverse right shoulder replacement, 2 back surgeries 2014 by Jessie and 2017 L3-S1 fusion by Daniel presenting with pain all over his body that has been going on for many years and after his recent admission to [facility] for oral abscess complicated by cardiac arrest and CPR and he was there for 4 weeks in the ICU and now he has home health and physical therapy at home that recommended to go for pain management because of his pain.  The patient has no incontinence no saddle paresthesia no paralysis.  He stated his lower back pain is equal intensity the same thing with all over his body pain.  He is already on Cymbalta 30 mg that helps him with his legs pain I suggested for him to contact his doctor and increase it to 60 mg.  The patient denies any red flags         4/17/2025 evaluation from PCP Tereso Kennedy:  Last month had swelling along his mouth and was told he need abx and should see his dentist at an urgent care. Dentist [place] [facility] [place], then come back the following day to get his teeth extracted d/t concern for osteomyelitis. [place] [facility] [place] ended up being [place]. Pt went into cardiac arrest and needed CPR for around 23 minutes. Concern is that pt had a reaction to vancomycin which could have precipitated this. Was intubated for over a week and had ICU delirium during this time. Lost 25 lbs and has been cold ever since. Was discharged on PO flagyl and has since completed his regimen. Was told that he didn't need anymore abx by a Dr. Valiente (wife isn't sure what their speciality is). 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 Was supposed to see a Dr. Georgina Davis, but when wife called  to schedule appt she says that the RN told her pt didn't need to be seen.     Procedures:   At [facility] before his back surgery    Portions of record reviewed for pertinent issues: active problem list, medication list, allergies, family history, social history, notes from last encounter, encounters, lab results, imaging and other available records.    I have personally reviewed the OARRS report for this patient. This report is scanned into the electronic medical record. I have considered the risks of abuse, dependence, addiction and diversion. It showed: Oxycodone 5 mg from [facility] Cleopatra Hoff   OPIOID RISK ASSESSMENT SCORE 3/26 used to be alcoholic quits drinking a year ago  Aberrant behavior: None  My patient has no underlying substance abuse or alcohol abuse and there's no mental health conditions contributing to the patient's pain.        Diagnostic studies:  4/8/2025 lumbar x-ray showed Status post L3-S1 posterior fixation with L3-L4 intervertebral construct. The hardware is intact. The alignment is unchanged.     26 2025 CT abdomen and pelvis showed stable fusion L3-S1:      Employment/disability/litigation: retired  for [employer]     Social history: , has 2 children a son and a daughter, no grandkids , obtain his GED, went to [employer]      Review of Systems   Constitutional:  Negative for chills, fever and unexpected weight change.   HENT:  Negative for congestion and trouble swallowing.    Respiratory:  Negative for cough, shortness of breath and wheezing.    Cardiovascular:  Negative for chest pain.   Gastrointestinal:  Negative for abdominal distention, abdominal pain, blood in stool, constipation, diarrhea, nausea and vomiting.   Genitourinary:  Negative for difficulty urinating.   Musculoskeletal:  Positive for back pain and myalgias. Negative for arthralgias and joint swelling.   Skin:  Negative for color change.   Neurological:  Positive for numbness.  Negative for dizziness, speech difficulty, light-headedness and headaches.   Psychiatric/Behavioral:  Negative for confusion and sleep disturbance.           Physical Exam  Constitutional:       General: He is awake.      Appearance: Normal appearance.   HENT:      Head: Normocephalic and atraumatic.      Nose: Nose normal.      Mouth/Throat:      Mouth: Mucous membranes are moist.   Eyes:      Pupils: Pupils are equal, round, and reactive to light.   Neck:      Thyroid: No thyroid mass.      Trachea: Phonation normal.   Cardiovascular:      Rate and Rhythm: Normal rate and regular rhythm.   Pulmonary:      Effort: Pulmonary effort is normal. No respiratory distress.      Breath sounds: Normal air entry. No decreased breath sounds, wheezing, rhonchi or rales.   Abdominal:      General: Bowel sounds are normal. There is no distension.      Palpations: Abdomen is soft.      Tenderness: There is no abdominal tenderness.   Musculoskeletal:         General: Tenderness present.      Cervical back: Neck supple.      Right lower leg: No edema.      Left lower leg: No edema.   Skin:     General: Skin is warm.      Capillary Refill: Capillary refill takes less than 2 seconds.   Neurological:      General: No focal deficit present.      Mental Status: He is alert. Mental status is at baseline. He is disoriented.      Cranial Nerves: Cranial nerves 2-12 are intact.      Motor: Motor function is intact.   Psychiatric:         Attention and Perception: Attention and perception normal.         Mood and Affect: Mood normal.         Speech: Speech normal.         Behavior: Behavior normal.            Assessment  Pleasant 64 years old gentleman with history of fibromyalgia for many years in addition to L3-S1 fusion in his back and bilateral leg neuropathy could be related to his surgery or to his diabetes.  We discussed fibromyalgia treatment and we are going to start with the gabapentin 300 mg titration and I gave him titration  schedule.  He is already on Cymbalta 30 mg I asked him to discuss it with his PCP to increase it to 60 mg.  I placed consult for Tin in addition to aquatic therapy.  The patient is candidate for opioid sparing infusion if everything else failed.  I gave him the information about the [facility] ketamine infusion center         Plan  At least 50% of the visit was involved in the discussion of the options for treatment. We discussed exercises, medication, interventional therapies and surgery. Healthy life style is essential with patient hard work to achieve the wellness. In addition; discussion with the patient and/or family about any of the diagnostic results, impressions and/or recommended diagnostic studies, prognosis, risks and benefits of treatment options, instructions for treatment and/or follow-up, importance of compliance with chosen treatment options, risk-factor reduction, and patient/family education.           Recommend self-directed physical therapy with at least daily exercises for minimum of 20-minute  Referral to Tin  Referral to aquatic therapy  Gabapentin 300 mg titration schedule handed to the patient  Recommended to ask his PCP to increase Cymbalta to 60 mg daily  Consider opioid sparing infusion in the future since his GFR is normal now and it looks like his heart is stable with ejection fraction of 60 to 65% and normal right ventricular function  Healthy lifestyle and anti-inflammatory diet in addition to weight control discussed with the patient  Alternative chronic pain therapies was discussed, encouraged and information was handed  Return to Clinic 2-3 months       *Please note this report has been produced using speech recognition software and may contain errors related to that system including grammar, punctuation and spelling as well as words and phrases that may be inappropriate. If there are questions or concerns, please feel free to contact me to clarify.    Eliza Samuel,  MD

## 2025-05-15 ENCOUNTER — OFFICE VISIT (OUTPATIENT)
Dept: PAIN MEDICINE | Facility: CLINIC | Age: 65
End: 2025-05-15
Payer: MEDICARE

## 2025-05-15 VITALS
RESPIRATION RATE: 16 BRPM | SYSTOLIC BLOOD PRESSURE: 124 MMHG | TEMPERATURE: 97.7 F | WEIGHT: 180 LBS | DIASTOLIC BLOOD PRESSURE: 75 MMHG | OXYGEN SATURATION: 99 % | HEIGHT: 68 IN | HEART RATE: 68 BPM | BODY MASS INDEX: 27.28 KG/M2

## 2025-05-15 DIAGNOSIS — M96.1 POSTLAMINECTOMY SYNDROME OF LUMBAR REGION: ICD-10-CM

## 2025-05-15 DIAGNOSIS — M79.7 FIBROMYALGIA: Primary | ICD-10-CM

## 2025-05-15 PROCEDURE — 1125F AMNT PAIN NOTED PAIN PRSNT: CPT | Performed by: ANESTHESIOLOGY

## 2025-05-15 PROCEDURE — 3078F DIAST BP <80 MM HG: CPT | Performed by: ANESTHESIOLOGY

## 2025-05-15 PROCEDURE — 99204 OFFICE O/P NEW MOD 45 MIN: CPT | Performed by: ANESTHESIOLOGY

## 2025-05-15 PROCEDURE — 3074F SYST BP LT 130 MM HG: CPT | Performed by: ANESTHESIOLOGY

## 2025-05-15 PROCEDURE — 99214 OFFICE O/P EST MOD 30 MIN: CPT | Performed by: ANESTHESIOLOGY

## 2025-05-15 PROCEDURE — 1159F MED LIST DOCD IN RCRD: CPT | Performed by: ANESTHESIOLOGY

## 2025-05-15 PROCEDURE — 3008F BODY MASS INDEX DOCD: CPT | Performed by: ANESTHESIOLOGY

## 2025-05-15 RX ORDER — GABAPENTIN 300 MG/1
300 CAPSULE ORAL 3 TIMES DAILY
Qty: 90 CAPSULE | Refills: 2 | Status: SHIPPED | OUTPATIENT
Start: 2025-05-15 | End: 2026-05-15

## 2025-05-15 RX ORDER — LANOLIN ALCOHOL/MO/W.PET/CERES
CREAM (GRAM) TOPICAL
COMMUNITY
Start: 2025-05-13

## 2025-05-15 SDOH — ECONOMIC STABILITY: FOOD INSECURITY: WITHIN THE PAST 12 MONTHS, YOU WORRIED THAT YOUR FOOD WOULD RUN OUT BEFORE YOU GOT MONEY TO BUY MORE.: NEVER TRUE

## 2025-05-15 SDOH — ECONOMIC STABILITY: FOOD INSECURITY: WITHIN THE PAST 12 MONTHS, THE FOOD YOU BOUGHT JUST DIDN'T LAST AND YOU DIDN'T HAVE MONEY TO GET MORE.: NEVER TRUE

## 2025-05-15 ASSESSMENT — PAIN SCALES - GENERAL
PAINLEVEL_OUTOF10: 6
PAINLEVEL_OUTOF10: 6

## 2025-05-15 ASSESSMENT — ENCOUNTER SYMPTOMS
DIZZINESS: 0
FEVER: 0
BACK PAIN: 1
SLEEP DISTURBANCE: 0
ARTHRALGIAS: 0
COUGH: 0
ABDOMINAL PAIN: 0
NUMBNESS: 1
UNEXPECTED WEIGHT CHANGE: 0
DIFFICULTY URINATING: 0
CONFUSION: 0
LIGHT-HEADEDNESS: 0
SHORTNESS OF BREATH: 0
HEADACHES: 0
CONSTIPATION: 0
MYALGIAS: 1
DEPRESSION: 0
BLOOD IN STOOL: 0
COLOR CHANGE: 0
CHILLS: 0
NAUSEA: 0
JOINT SWELLING: 0
TROUBLE SWALLOWING: 0
OCCASIONAL FEELINGS OF UNSTEADINESS: 0
ABDOMINAL DISTENTION: 0
LOSS OF SENSATION IN FEET: 0
SPEECH DIFFICULTY: 0
DIARRHEA: 0
WHEEZING: 0
VOMITING: 0

## 2025-05-15 ASSESSMENT — PATIENT HEALTH QUESTIONNAIRE - PHQ9
2. FEELING DOWN, DEPRESSED OR HOPELESS: NOT AT ALL
1. LITTLE INTEREST OR PLEASURE IN DOING THINGS: NOT AT ALL
SUM OF ALL RESPONSES TO PHQ9 QUESTIONS 1 AND 2: 0

## 2025-05-15 ASSESSMENT — PAIN - FUNCTIONAL ASSESSMENT: PAIN_FUNCTIONAL_ASSESSMENT: 0-10

## 2025-05-15 ASSESSMENT — LIFESTYLE VARIABLES: TOTAL SCORE: 0

## 2025-05-15 ASSESSMENT — PAIN DESCRIPTION - DESCRIPTORS: DESCRIPTORS: ACHING;SHOOTING

## 2025-05-15 NOTE — PATIENT INSTRUCTIONS
[facility] has different protocol for ketamine infusion you could contact Dr. Elder Orr [phone] and make an appointment for evaluation and see if there are infusion there works better for you      Gabapentin titration     Please take Gabapentin 300 mg capsules as follows:              AM         PM    Bedtime       Day 1 0 0 1   Day 2 0 0 1   Day 3 0 1 1   Day 4 0 1 1   Day 5 1 1 1   Day 6 1 1 1   Day 7 1 1 1   Day 8 1 1 2   Day 9 1 1 2   Day 10 1 2 2   Day 11 1 2 2   Day 12 2 2 2   Day 13 2 2 2   Day 14 2 2 3   Day 15 2 2 3   Day 16 2 3 3   Day 17 2 3 3   Day 18 3 3 3   Day 19 3 3 3   Day 20 3 3 4   Day 21 3 3 4   Day 22 3 4 4   Day 23 3 4 4   Day 24 4 4 4     Do not exceed 3600mg per day.   Stop and maintain dose when symptoms resolve.  Reduce as instructed if side effects not tolerated.

## 2025-05-20 ENCOUNTER — TELEPHONE (OUTPATIENT)
Dept: PRIMARY CARE | Facility: CLINIC | Age: 65
End: 2025-05-20
Payer: MEDICARE

## 2025-05-20 DIAGNOSIS — G54.1 LUMBOSACRAL PLEXUS LESION: ICD-10-CM

## 2025-05-20 RX ORDER — DULOXETIN HYDROCHLORIDE 60 MG/1
60 CAPSULE, DELAYED RELEASE ORAL DAILY
Qty: 90 CAPSULE | Refills: 3 | Status: SHIPPED | OUTPATIENT
Start: 2025-05-20 | End: 2025-05-23 | Stop reason: SDUPTHER

## 2025-05-20 NOTE — TELEPHONE ENCOUNTER
Harry called today to ask if you can increase his Duloxetine to 60 mg, he is currently taking 30 mg, he went to see his pain medicine doc Dr Samuel on 05/15/25 and he advised Harry to ask if you can increase the dose, See notes in EMR.      Please advise

## 2025-05-23 DIAGNOSIS — G54.1 LUMBOSACRAL PLEXUS LESION: ICD-10-CM

## 2025-05-23 RX ORDER — DULOXETIN HYDROCHLORIDE 30 MG/1
30 CAPSULE, DELAYED RELEASE ORAL DAILY
Qty: 90 CAPSULE | Refills: 3 | Status: SHIPPED | OUTPATIENT
Start: 2025-05-23 | End: 2026-05-23

## 2025-05-23 NOTE — TELEPHONE ENCOUNTER
Pt has been taking two of his 30mg duloxetine while waiting [facility]. In the meantime, feels dizzy and nauseous and off-balance, so he'd just like to go back to 30mg and recall the 60mg order.

## 2025-05-27 ENCOUNTER — APPOINTMENT (OUTPATIENT)
Dept: INTEGRATIVE MEDICINE | Facility: CLINIC | Age: 65
End: 2025-05-27
Payer: MEDICARE

## 2025-05-27 ENCOUNTER — TELEPHONE (OUTPATIENT)
Dept: RADIOLOGY | Facility: HOSPITAL | Age: 65
End: 2025-05-27

## 2025-05-27 NOTE — TELEPHONE ENCOUNTER
Assisted Harry with rescheduling the CT lung screening imaging. [place] reports that his daughter will be getting  6/7/2025 and he would like to wait until after to have imaging completed. CT lung screening imaging scheduled at [facility] with an arrival of 1230 and imaging at 1245. Reviewed with Harry no preparation is needed.

## 2025-06-10 ENCOUNTER — HOSPITAL ENCOUNTER (OUTPATIENT)
Dept: RADIOLOGY | Facility: CLINIC | Age: 65
Discharge: HOME | End: 2025-06-10
Payer: MEDICARE

## 2025-06-10 DIAGNOSIS — Z87.891 PERSONAL HISTORY OF NICOTINE DEPENDENCE: ICD-10-CM

## 2025-06-10 LAB
NON-UH HIE A/G RATIO: 0.9
NON-UH HIE ALB: 3.6 G/DL (ref 3.4–5)
NON-UH HIE ALK PHOS: 138 UNIT/L (ref 45–117)
NON-UH HIE BILIRUBIN, TOTAL: 1.1 MG/DL (ref 0.3–1.2)
NON-UH HIE BUN/CREAT RATIO: 11.1
NON-UH HIE BUN: 10 MG/DL (ref 9–23)
NON-UH HIE CALCIUM: 9.9 MG/DL (ref 8.7–10.4)
NON-UH HIE CALCULATED OSMOLALITY: 282 MOSM/KG (ref 275–295)
NON-UH HIE CHLORIDE: 104 MMOL/L (ref 98–107)
NON-UH HIE CO2, VENOUS: 30 MMOL/L (ref 20–31)
NON-UH HIE CREATININE: 0.9 MG/DL (ref 0.6–1.1)
NON-UH HIE GFR AA: >60
NON-UH HIE GLOBULIN: 3.9 G/DL
NON-UH HIE GLOMERULAR FILTRATION RATE: >60 ML/MIN/1.73M?
NON-UH HIE GLUCOSE: 96 MG/DL (ref 74–106)
NON-UH HIE GOT: 14 UNIT/L (ref 15–37)
NON-UH HIE GPT: 8 UNIT/L (ref 10–49)
NON-UH HIE HGB A1C: 6.2 %
NON-UH HIE K: 4.5 MMOL/L (ref 3.5–5.1)
NON-UH HIE NA: 142 MMOL/L (ref 135–145)
NON-UH HIE TOTAL PROTEIN: 7.5 G/DL (ref 5.7–8.2)
NON-UH HIE TSH: 4.2 UIU/ML (ref 0.55–4.78)

## 2025-06-10 PROCEDURE — 71271 CT THORAX LUNG CANCER SCR C-: CPT | Performed by: RADIOLOGY

## 2025-06-10 PROCEDURE — 71271 CT THORAX LUNG CANCER SCR C-: CPT

## 2025-07-08 DIAGNOSIS — I10 BENIGN HYPERTENSION: ICD-10-CM

## 2025-07-08 RX ORDER — EZETIMIBE 10 MG/1
10 TABLET ORAL DAILY
Qty: 90 TABLET | Refills: 3 | Status: SHIPPED | OUTPATIENT
Start: 2025-07-08

## 2025-07-11 ENCOUNTER — APPOINTMENT (OUTPATIENT)
Dept: PRIMARY CARE | Facility: CLINIC | Age: 65
End: 2025-07-11
Payer: MEDICARE

## 2025-07-11 VITALS
OXYGEN SATURATION: 93 % | HEIGHT: 68 IN | WEIGHT: 192.5 LBS | SYSTOLIC BLOOD PRESSURE: 126 MMHG | RESPIRATION RATE: 14 BRPM | DIASTOLIC BLOOD PRESSURE: 70 MMHG | HEART RATE: 54 BPM | BODY MASS INDEX: 29.18 KG/M2

## 2025-07-11 DIAGNOSIS — Z12.5 SCREENING FOR PROSTATE CANCER: ICD-10-CM

## 2025-07-11 DIAGNOSIS — I25.10 CORONARY ARTERY DISEASE INVOLVING NATIVE CORONARY ARTERY OF NATIVE HEART WITHOUT ANGINA PECTORIS: ICD-10-CM

## 2025-07-11 DIAGNOSIS — E11.69 TYPE 2 DIABETES MELLITUS WITH OTHER SPECIFIED COMPLICATION, UNSPECIFIED WHETHER LONG TERM INSULIN USE (MULTI): ICD-10-CM

## 2025-07-11 DIAGNOSIS — F32.0 CURRENT MILD EPISODE OF MAJOR DEPRESSIVE DISORDER WITHOUT PRIOR EPISODE: ICD-10-CM

## 2025-07-11 DIAGNOSIS — Z12.11 ENCOUNTER FOR SCREENING FOR MALIGNANT NEOPLASM OF COLON: ICD-10-CM

## 2025-07-11 DIAGNOSIS — Z00.00 MEDICARE ANNUAL WELLNESS VISIT, SUBSEQUENT: Primary | ICD-10-CM

## 2025-07-11 PROCEDURE — 1158F ADVNC CARE PLAN TLK DOCD: CPT | Performed by: INTERNAL MEDICINE

## 2025-07-11 PROCEDURE — 1159F MED LIST DOCD IN RCRD: CPT | Performed by: INTERNAL MEDICINE

## 2025-07-11 PROCEDURE — 99212 OFFICE O/P EST SF 10 MIN: CPT | Performed by: INTERNAL MEDICINE

## 2025-07-11 PROCEDURE — 3074F SYST BP LT 130 MM HG: CPT | Performed by: INTERNAL MEDICINE

## 2025-07-11 PROCEDURE — 4010F ACE/ARB THERAPY RXD/TAKEN: CPT | Performed by: INTERNAL MEDICINE

## 2025-07-11 PROCEDURE — G0439 PPPS, SUBSEQ VISIT: HCPCS | Performed by: INTERNAL MEDICINE

## 2025-07-11 PROCEDURE — 1170F FXNL STATUS ASSESSED: CPT | Performed by: INTERNAL MEDICINE

## 2025-07-11 PROCEDURE — 3078F DIAST BP <80 MM HG: CPT | Performed by: INTERNAL MEDICINE

## 2025-07-11 PROCEDURE — 3008F BODY MASS INDEX DOCD: CPT | Performed by: INTERNAL MEDICINE

## 2025-07-11 PROCEDURE — 1123F ACP DISCUSS/DSCN MKR DOCD: CPT | Performed by: INTERNAL MEDICINE

## 2025-07-11 RX ORDER — OXYCODONE HYDROCHLORIDE 5 MG/1
5 TABLET ORAL EVERY 6 HOURS PRN
COMMUNITY
Start: 2025-07-02

## 2025-07-11 RX ORDER — LOSARTAN POTASSIUM 25 MG/1
25 TABLET ORAL DAILY
COMMUNITY

## 2025-07-11 ASSESSMENT — PATIENT HEALTH QUESTIONNAIRE - PHQ9
3. TROUBLE FALLING OR STAYING ASLEEP OR SLEEPING TOO MUCH: MORE THAN HALF THE DAYS
4. FEELING TIRED OR HAVING LITTLE ENERGY: SEVERAL DAYS
9. THOUGHTS THAT YOU WOULD BE BETTER OFF DEAD, OR OF HURTING YOURSELF: NOT AT ALL
8. MOVING OR SPEAKING SO SLOWLY THAT OTHER PEOPLE COULD HAVE NOTICED. OR THE OPPOSITE, BEING SO FIGETY OR RESTLESS THAT YOU HAVE BEEN MOVING AROUND A LOT MORE THAN USUAL: NOT AT ALL
1. LITTLE INTEREST OR PLEASURE IN DOING THINGS: NEARLY EVERY DAY
6. FEELING BAD ABOUT YOURSELF - OR THAT YOU ARE A FAILURE OR HAVE LET YOURSELF OR YOUR FAMILY DOWN: SEVERAL DAYS
SUM OF ALL RESPONSES TO PHQ9 QUESTIONS 1 AND 2: 6
5. POOR APPETITE OR OVEREATING: SEVERAL DAYS
SUM OF ALL RESPONSES TO PHQ QUESTIONS 1-9: 12
7. TROUBLE CONCENTRATING ON THINGS, SUCH AS READING THE NEWSPAPER OR WATCHING TELEVISION: SEVERAL DAYS
2. FEELING DOWN, DEPRESSED OR HOPELESS: NEARLY EVERY DAY

## 2025-07-11 ASSESSMENT — ACTIVITIES OF DAILY LIVING (ADL)
DOING_HOUSEWORK: INDEPENDENT
GROCERY_SHOPPING: INDEPENDENT
DRESSING: INDEPENDENT
MANAGING_FINANCES: INDEPENDENT
BATHING: INDEPENDENT
TAKING_MEDICATION: INDEPENDENT

## 2025-07-11 ASSESSMENT — ENCOUNTER SYMPTOMS
DIZZINESS: 0
SHORTNESS OF BREATH: 0
DEPRESSION: 1
OCCASIONAL FEELINGS OF UNSTEADINESS: 1
CONSTIPATION: 0
HEADACHES: 0
BLOOD IN STOOL: 0
FATIGUE: 1
DYSPHORIC MOOD: 1
LOSS OF SENSATION IN FEET: 0
SLEEP DISTURBANCE: 0

## 2025-07-11 NOTE — PATIENT INSTRUCTIONS
Please remember you need to be fasting when you obtain your labwork. That means nothing to eat or drink for 8-12 hours (water or black coffee are OK though).    We recommend getting your Tetanus shot at the pharmacy.

## 2025-07-11 NOTE — PROGRESS NOTES
"Subjective   Reason for Visit: Harry Ochoa is an 64 y.o. male here for a Medicare Wellness visit.          Reviewed all medications by prescribing practitioner or clinical pharmacist (such as prescriptions, OTCs, herbal therapies and supplements) and documented in the medical record.    Pt here for his MAW.    Is worried about getting a knee replacement. Had back surgery in December, followed by the cardiac arrest in March.    Falls asleep at 2am and sleeps until 11am. Will drink 2 cups of coffee right when he wakes up, then iced tea as the day goes on. Has been like this since he retired.    Feels more down and tired. Feels like part of him feeling down is him realizing he cannot do all the things he used to do anymore.    Since the cardiac arrest, has felt increasingly depressed. Part of the reason is pt is physically limited thanks to various physical injuries over the year.      Patient Care Team:  Tereso Kennedy MD as PCP - General (Internal Medicine)  Rinku Matamoros DO as PCP - [employer] Advantage PCP  Kt Marks MD as Consulting Physician (Gastroenterology)  Gigi Pryor MD as Consulting Physician (Cardiology)  Rober Beavers PA-C as Physician Assistant (Cardiology)     Pt is not considered to be at high risk of opioid abuse after reviewing chart.    Review of Systems   Constitutional:  Positive for fatigue.   Respiratory:  Negative for shortness of breath.    Cardiovascular:  Negative for chest pain.   Gastrointestinal:  Negative for blood in stool and constipation.   Neurological:  Negative for dizziness and headaches.   Psychiatric/Behavioral:  Positive for dysphoric mood. Negative for sleep disturbance.        Objective   Vitals:  /70 (BP Location: Right arm, Patient Position: Sitting)   Pulse 54   Resp 14   Ht 1.727 m (5' 8\")   Wt 87.3 kg (192 lb 8 oz)   SpO2 93%   BMI 29.27 kg/m²       Physical Exam  Constitutional:       General: He is not in acute distress.     Appearance: He is not " ill-appearing, toxic-appearing or diaphoretic.   HENT:      Head: Normocephalic and atraumatic.   Eyes:      Conjunctiva/sclera: Conjunctivae normal.   Cardiovascular:      Rate and Rhythm: Normal rate and regular rhythm.      Heart sounds: No murmur heard.     No friction rub. No gallop.   Pulmonary:      Effort: Pulmonary effort is normal. No respiratory distress.      Breath sounds: No stridor. No wheezing, rhonchi or rales.   Abdominal:      General: Abdomen is flat. There is no distension.      Palpations: Abdomen is soft.      Tenderness: There is no abdominal tenderness. There is no guarding.   Neurological:      Mental Status: He is alert.       Assessment/Plan   Assessment & Plan  Medicare annual wellness visit, subsequent         Coronary artery disease involving native coronary artery of native heart without angina pectoris  -Had a FLACO. Sees Dr. Pryor. On ASA, statin, and zetia.  Orders:    CBC; Future    Lipid panel; Future    Screening for prostate cancer    Orders:    PSA; Future    Encounter for screening for malignant neoplasm of colon  -Last colonoscopy a year ago. Told to repeat after 1 year. Order placed.  Orders:    Colonoscopy Screening; High Risk Patient; Future    Current mild episode of major depressive disorder without prior episode  -Has been increasingly depressed since his cardiac arrest. Physical decline also contributing.  -Taking duloxetine for nerve pain. Dose increase attempted recently but pt got side effects and could not tolerate.  -Considered wellbutrin for symptoms, however he did have a seizure once in his 20s thought to be from a low blood sugar episode. No other episodes since. Will have pt seen by quick access clinic to decide on best next step. Pt would like management done here, but is agreeable with a one time visit. Referral in. Will see back 4-6 weeks after pt evaluated by psychiatry to follow up and resume management.  Orders:    [facility]; Future    Type 2 diabetes mellitus with other specified complication, unspecified whether long term insulin use (Multi)  -Sees Dr. Call for this. Last A1c a month ago 6.2%.       -Discussed TDAP.     Advance Directives Discussion  Advanced Care Planning (including a Living Will, Healthcare POA, as well as specific end of life choices and/or directives), was discussed with the patient and/or surrogate, voluntarily, and details of that discussion documented in the Problem List (under Advanced Directives Discussion) of the medical record.  Pt has a living will and HCPOA. Pt's HCPOA is his wife. Has no alternatives listed, but understands his NOK would be his two children. Both added to chart. Pt confirms he wishes to be full code.   (~16 min spent discussing above)

## 2025-07-17 DIAGNOSIS — M79.7 FIBROMYALGIA: ICD-10-CM

## 2025-07-17 DIAGNOSIS — M96.1 POSTLAMINECTOMY SYNDROME OF LUMBAR REGION: ICD-10-CM

## 2025-07-17 RX ORDER — GABAPENTIN 300 MG/1
300 CAPSULE ORAL 3 TIMES DAILY
Qty: 90 CAPSULE | Refills: 2 | Status: SHIPPED | OUTPATIENT
Start: 2025-07-17 | End: 2026-07-17

## 2025-07-25 ENCOUNTER — TELEPHONE (OUTPATIENT)
Dept: PAIN MEDICINE | Facility: CLINIC | Age: 65
End: 2025-07-25
Payer: MEDICARE

## 2025-07-25 NOTE — TELEPHONE ENCOUNTER
Patient left a message stating  his surgeon dr sr will no longer prescribe his oxycodone. He is wanting to know how he can go about getting this medication?

## 2025-07-29 ENCOUNTER — TELEPHONE (OUTPATIENT)
Dept: INFUSION THERAPY | Facility: CLINIC | Age: 65
End: 2025-07-29
Payer: MEDICARE

## 2025-07-31 ENCOUNTER — OFFICE VISIT (OUTPATIENT)
Dept: PAIN MEDICINE | Facility: CLINIC | Age: 65
End: 2025-07-31
Payer: MEDICARE

## 2025-07-31 VITALS
WEIGHT: 192 LBS | HEART RATE: 54 BPM | RESPIRATION RATE: 16 BRPM | OXYGEN SATURATION: 98 % | DIASTOLIC BLOOD PRESSURE: 82 MMHG | HEIGHT: 68 IN | BODY MASS INDEX: 29.1 KG/M2 | TEMPERATURE: 97.2 F | SYSTOLIC BLOOD PRESSURE: 141 MMHG

## 2025-07-31 DIAGNOSIS — Z79.899 MEDICATION MANAGEMENT: Primary | ICD-10-CM

## 2025-07-31 DIAGNOSIS — M96.1 POSTLAMINECTOMY SYNDROME OF LUMBAR REGION: ICD-10-CM

## 2025-07-31 DIAGNOSIS — G89.4 CHRONIC PAIN SYNDROME: ICD-10-CM

## 2025-07-31 PROCEDURE — 3008F BODY MASS INDEX DOCD: CPT | Performed by: ANESTHESIOLOGY

## 2025-07-31 PROCEDURE — 1125F AMNT PAIN NOTED PAIN PRSNT: CPT | Performed by: ANESTHESIOLOGY

## 2025-07-31 PROCEDURE — 1159F MED LIST DOCD IN RCRD: CPT | Performed by: ANESTHESIOLOGY

## 2025-07-31 PROCEDURE — 3079F DIAST BP 80-89 MM HG: CPT | Performed by: ANESTHESIOLOGY

## 2025-07-31 PROCEDURE — 4010F ACE/ARB THERAPY RXD/TAKEN: CPT | Performed by: ANESTHESIOLOGY

## 2025-07-31 PROCEDURE — 3077F SYST BP >= 140 MM HG: CPT | Performed by: ANESTHESIOLOGY

## 2025-07-31 PROCEDURE — 99214 OFFICE O/P EST MOD 30 MIN: CPT | Performed by: ANESTHESIOLOGY

## 2025-07-31 RX ORDER — OXYCODONE AND ACETAMINOPHEN 5; 325 MG/1; MG/1
1 TABLET ORAL 2 TIMES DAILY PRN
Qty: 60 TABLET | Refills: 0 | Status: SHIPPED | OUTPATIENT
Start: 2025-07-31 | End: 2025-08-30

## 2025-07-31 RX ORDER — OXYCODONE AND ACETAMINOPHEN 5; 325 MG/1; MG/1
1 TABLET ORAL 2 TIMES DAILY PRN
Qty: 60 TABLET | Refills: 0 | Status: SHIPPED | OUTPATIENT
Start: 2025-07-31 | End: 2025-07-31

## 2025-07-31 ASSESSMENT — PAIN SCALES - GENERAL
PAINLEVEL_OUTOF10: 6
PAINLEVEL_OUTOF10: 6

## 2025-07-31 ASSESSMENT — ENCOUNTER SYMPTOMS
LOSS OF SENSATION IN FEET: 0
OCCASIONAL FEELINGS OF UNSTEADINESS: 0
DEPRESSION: 0

## 2025-07-31 ASSESSMENT — PAIN - FUNCTIONAL ASSESSMENT: PAIN_FUNCTIONAL_ASSESSMENT: 0-10

## 2025-07-31 NOTE — PROGRESS NOTES
This is 64 y.o.  male with who has been treated for Fibromyalgia and Postlaminectomy syndrome of lumbar region. Pain is better,no burning , no numbing . However has lower back pain. The pain is described as straining and aching and is relieved by Medications Percocet with who is here for follow-up   Chief Complaint   Patient presents with    Follow-up       Pain Therapies: requesting percocet ,patient states that the gabapentin does not work.  5/15/2025 Orders Placed    Referral to [facility] Authorized  Referral to [facility] Authorized  Referral to [facility] Authorized  Referral to Physical Therapy Pending Review    Recommend self-directed physical therapy with at least daily exercises for minimum of 20-minute  Referral to [facility]  Referral to aquatic therapy  Gabapentin 300 mg titration schedule handed to the patient  Recommended to ask his PCP to increase Cymbalta to 60 mg daily  Consider opioid sparing infusion in the future since his GFR is normal now and it looks like his heart is stable with ejection fraction of 60 to 65% and normal right ventricular function  Healthy lifestyle and anti-inflammatory diet in addition to weight control discussed with the patient  Alternative chronic pain therapies was discussed, encouraged and information was handed  Return to Clinic 2-3 months

## 2025-07-31 NOTE — PROGRESS NOTES
SUBJECTIVE:  This is 64 y.o.  male with PMH of smoker!!, chronic pancreatitis and alcoholism stopped drinking, hypertension, diabetes, CAD x 4 stents, CVA, s/p reverse right shoulder replacement, 2 back surgeries 2014 by Jessie and 2017 L3-S1 fusion by Daniel then another surgery and fusion by Dr. Roman on 12/30/2024 with bilateral laminectomy and frenectomy at L3-4 with lumbar fusion with pedicle screw L3-S1 who is here for follow-up with stated that Dr. Roman was giving him oxycodone 5 mg he takes it once or twice a day to help him with his pain and now he is sending him to pain management.  He takes the gabapentin once in the morning and once in the evening and that also support his pain.  He signed agreement today and I gave him prescription of Percocet 5 mg to take maximum 64 months and continue with the gabapentin.  The patient has new symptoms of neck pain with radiation to his right arm he thought that is coming from his replaced shoulder but it is most likely radiculopathy.  May consider cervical NIXON after we reviewed the MRI OARRS done.      Prior office visit:  5/15/2025: The patient was referred to us by Referring Provider: Home health nurse. this is 64 y.o.  male with a past history of smoker!!, chronic pancreatitis and alcoholism stopped drinking, hypertension, diabetes, CAD x 4 stents, CVA, s/p reverse right shoulder replacement, 2 back surgeries 2014 by Jessie and 2017 L3-S1 fusion by Daniel presenting with pain all over his body that has been going on for many years and after his recent admission to [facility] for oral abscess complicated by cardiac arrest and CPR and he was there for 4 weeks in the ICU and now he has home health and physical therapy at home that recommended to go for pain management because of his pain.  The patient has no incontinence no saddle paresthesia no paralysis.  He stated his lower back pain is equal intensity the same thing with all over his body pain.  He  is already on Cymbalta 30 mg that helps him with his legs pain I suggested for him to contact his doctor and increase it to 60 mg.  The patient denies any red flags  Assessment  Pleasant 64 years old gentleman with history of fibromyalgia for many years in addition to L3-S1 fusion in his back and bilateral leg neuropathy could be related to his surgery or to his diabetes.  We discussed fibromyalgia treatment and we are going to start with the gabapentin 300 mg titration and I gave him titration schedule.  He is already on Cymbalta 30 mg I asked him to discuss it with his PCP to increase it to 60 mg.  I placed consult for [facility] in addition to aquatic therapy.  The patient is candidate for opioid sparing infusion if everything else failed.  I gave him the information about the [facility] ketamine infusion center  Plan  Referral to [facility]  Referral to aquatic therapy  Gabapentin 300 mg titration schedule handed to the patient  Recommended to ask his PCP to increase Cymbalta to 60 mg daily  Consider opioid sparing infusion in the future since his GFR is normal now and it looks like his heart is stable with ejection fraction of 60 to 65% and normal right ventricular function        4/17/2025 evaluation from PCP Tereso Kennedy:  Last month had swelling along his mouth and was told he need abx and should see his dentist at an urgent care. Dentist suggested pt go to [facility] to get abx, then come back the following day to get his teeth extracted d/t concern for osteomyelitis. Pt went to [facility] and ended up being admitted. Pt went into cardiac arrest and needed CPR for around 23 minutes. Concern is that pt had a reaction to vancomycin which could have precipitated this. Was intubated for over a week and had ICU delirium during this time. Lost 25 lbs and has been cold ever since. Was discharged on PO flagyl and has since completed his regimen. Was told that he didn't need anymore abx by a Dr. Valiente (wife isn't sure what their  speciality is). 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 Was supposed to see a Dr. Georgina Davis, but when wife called to schedule appt she says that the RN told her pt didn't need to be seen.      Procedures:   At [facility] before his back surgery     Portions of record reviewed for pertinent issues: active problem list, medication list, allergies, family history, social history, notes from last encounter, encounters, lab results, imaging and other available records.     I have personally reviewed the OARRS report for this patient. This report is scanned into the electronic medical record. I have considered the risks of abuse, dependence, addiction and diversion. It showed: Oxycodone 5 mg from [facility]   OPIOID RISK ASSESSMENT SCORE 3/26 used to be alcoholic quits drinking a year ago  Aberrant behavior: None  My patient has no underlying substance abuse or alcohol abuse and there's no mental health conditions contributing to the patient's pain.           Diagnostic studies:  4/8/2025 lumbar x-ray showed Status post L3-S1 posterior fixation with L3-L4 intervertebral construct. The hardware is intact. The alignment is unchanged.      26 2025 CT abdomen and pelvis showed stable fusion L3-S1:       Employment/disability/litigation: retired  for [employer]     Social history: , has 2 children a son and a daughter, no grandkids , obtain his GED, went to [employer]        Review of Systems   Constitutional:  Negative for chills, fever and unexpected weight change.   HENT:  Negative for congestion and trouble swallowing.    Respiratory:  Negative for cough, shortness of breath and wheezing.    Cardiovascular:  Negative for chest pain.   Gastrointestinal:  Negative for abdominal distention, abdominal pain, blood in stool, constipation, diarrhea, nausea and vomiting.   Genitourinary:  Negative for difficulty urinating.    Musculoskeletal:  Positive for back pain, neck pain with radiation to right arm and myalgias/arthralgia   Skin:  Negative for color change.   Neurological:  Positive for numbness. Negative for dizziness, speech difficulty, light-headedness and headaches.   Psychiatric/Behavioral:  Negative for confusion and sleep disturbance.             Physical Exam  Constitutional:       General: He is awake.      Appearance: Normal appearance.   HENT:      Head: Normocephalic and atraumatic.      Nose: Nose normal.      Mouth/Throat:      Mouth: Mucous membranes are moist.   Eyes:      Pupils: Pupils are equal, round, and reactive to light.   Neck:      Thyroid: No thyroid mass.      Trachea: Phonation normal.   Cardiovascular:      Rate and Rhythm: Normal rate and regular rhythm.   Pulmonary:      Effort: Pulmonary effort is normal. No respiratory distress.      Breath sounds: Normal air entry. No decreased breath sounds, wheezing, rhonchi or rales.   Abdominal:      General: Bowel sounds are normal. There is no distension.      Palpations: Abdomen is soft.      Tenderness: There is no abdominal tenderness.   Musculoskeletal:         General: Tenderness present.      Cervical back: Neck supple.      Right lower leg: No edema.      Left lower leg: No edema.   Skin:     General: Skin is warm.      Capillary Refill: Capillary refill takes less than 2 seconds.   Neurological:      General: No focal deficit present.      Mental Status: He is alert. Mental status is at baseline. He is disoriented.  Lhermitte's - Spurling's + in the right arm     Cranial Nerves: Cranial nerves 2-12 are intact.      Motor: Motor function is intact.   Psychiatric:         Attention and Perception: Attention and perception normal.         Mood and Affect: Mood normal.         Speech: Speech normal.         Behavior: Behavior normal.                                Plan  At least 50% of the visit was involved in the discussion of the options for  treatment. We discussed exercises, medication, interventional therapies and surgery. Healthy life style is essential with patient hard work to achieve the wellness. In addition; discussion with the patient and/or family about any of the diagnostic results, impressions and/or recommended diagnostic studies, prognosis, risks and benefits of treatment options, instructions for treatment and/or follow-up, importance of compliance with chosen treatment options, risk-factor reduction, and patient/family education.         Continue self-directed physical therapy at least daily exercises for minimum of 20-minute  Smoking cessation  Refill Percocet 5 mg x 2 daily as needed maximum 64 months  Consider right C6-7 interlaminar NIXON after the MRI done on his neck  Healthy lifestyle and anti-inflammatory diet in addition to weight control discussed with the patient  Alternative chronic pain therapies was discussed, encouraged and information was handed  Return to Clinic 3 months     *Please note this report has been produced using speech recognition software and may contain errors related to that system including grammar, punctuation and spelling as well as words and phrases that may be inappropriate. If there are questions or concerns, please feel free to contact me to clarify.    Eliza Samuel MD

## 2025-08-03 LAB
1OH-MIDAZOLAM UR-MCNC: NEGATIVE NG/ML
7AMINOCLONAZEPAM UR-MCNC: NEGATIVE NG/ML
A-OH ALPRAZ UR-MCNC: NEGATIVE NG/ML
A-OH-TRIAZOLAM UR-MCNC: NEGATIVE NG/ML
AMPHETAMINES UR QL: NEGATIVE NG/ML
BARBITURATES UR QL: NEGATIVE NG/ML
BZE UR QL: NEGATIVE NG/ML
CODEINE UR-MCNC: NEGATIVE NG/ML
CREAT UR-MCNC: 147.9 MG/DL
DRUG SCREEN COMMENT UR-IMP: ABNORMAL
EDDP UR-MCNC: ABNORMAL NG/ML
FENTANYL UR-MCNC: ABNORMAL NG/ML
HYDROCODONE UR-MCNC: NEGATIVE NG/ML
HYDROMORPHONE UR-MCNC: NEGATIVE NG/ML
LORAZEPAM UR-MCNC: NEGATIVE NG/ML
METHADONE UR-MCNC: ABNORMAL UG/L
MORPHINE UR-MCNC: NEGATIVE NG/ML
NORDIAZEPAM UR-MCNC: NEGATIVE NG/ML
NORFENTANYL UR-MCNC: ABNORMAL NG/ML
NORHYDROCODONE UR CFM-MCNC: NEGATIVE NG/ML
NOROXYCODONE UR CFM-MCNC: 1330 NG/ML
NORTRAMADOL UR-MCNC: NEGATIVE NG/ML
OH-ETHYLFLURAZ UR-MCNC: NEGATIVE NG/ML
OXAZEPAM UR-MCNC: NEGATIVE NG/ML
OXIDANTS UR QL: NEGATIVE MCG/ML
OXYCODONE UR CFM-MCNC: 558 NG/ML
OXYMORPHONE UR CFM-MCNC: 724 NG/ML
PCP UR QL: NEGATIVE NG/ML
PH UR: 7.4 [PH] (ref 4.5–9)
QUEST 6 ACETYLMORPHINE: ABNORMAL
QUEST NOTES AND COMMENTS: ABNORMAL
QUEST PATIENT HISTORICAL REPORT: ABNORMAL
QUEST ZOLPIDEM: ABNORMAL
TEMAZEPAM UR-MCNC: NEGATIVE NG/ML
THC UR QL: NEGATIVE NG/ML
TRAMADOL UR-MCNC: NEGATIVE NG/ML
ZOLPIDEM PHENYL-4-CARB UR CFM-MCNC: ABNORMAL NG/ML

## 2025-08-05 ENCOUNTER — APPOINTMENT (OUTPATIENT)
Dept: BEHAVIORAL HEALTH | Facility: CLINIC | Age: 65
End: 2025-08-05
Payer: MEDICARE

## 2025-08-05 DIAGNOSIS — F43.23 ADJUSTMENT DISORDER WITH MIXED ANXIETY AND DEPRESSED MOOD: ICD-10-CM

## 2025-08-05 LAB
1OH-MIDAZOLAM UR-MCNC: NEGATIVE NG/ML
7AMINOCLONAZEPAM UR-MCNC: NEGATIVE NG/ML
A-OH ALPRAZ UR-MCNC: NEGATIVE NG/ML
A-OH-TRIAZOLAM UR-MCNC: NEGATIVE NG/ML
AMPHETAMINES UR QL: NEGATIVE NG/ML
BARBITURATES UR QL: NEGATIVE NG/ML
BZE UR QL: NEGATIVE NG/ML
CODEINE UR-MCNC: NEGATIVE NG/ML
CREAT UR-MCNC: 147.9 MG/DL
DRUG SCREEN COMMENT UR-IMP: ABNORMAL
EDDP UR-MCNC: NEGATIVE NG/ML
FENTANYL UR-MCNC: NEGATIVE NG/ML
HYDROCODONE UR-MCNC: NEGATIVE NG/ML
HYDROMORPHONE UR-MCNC: NEGATIVE NG/ML
LORAZEPAM UR-MCNC: NEGATIVE NG/ML
METHADONE UR-MCNC: NEGATIVE NG/ML
MORPHINE UR-MCNC: NEGATIVE NG/ML
NORDIAZEPAM UR-MCNC: NEGATIVE NG/ML
NORFENTANYL UR-MCNC: NEGATIVE NG/ML
NORHYDROCODONE UR CFM-MCNC: NEGATIVE NG/ML
NOROXYCODONE UR CFM-MCNC: 1330 NG/ML
NORTRAMADOL UR-MCNC: NEGATIVE NG/ML
OH-ETHYLFLURAZ UR-MCNC: NEGATIVE NG/ML
OXAZEPAM UR-MCNC: NEGATIVE NG/ML
OXIDANTS UR QL: NEGATIVE MCG/ML
OXYCODONE UR CFM-MCNC: 558 NG/ML
OXYMORPHONE UR CFM-MCNC: 724 NG/ML
PCP UR QL: NEGATIVE NG/ML
PH UR: 7.4 [PH] (ref 4.5–9)
QUEST 6 ACETYLMORPHINE: NEGATIVE NG/ML
QUEST NOTES AND COMMENTS: ABNORMAL
QUEST ZOLPIDEM: NEGATIVE NG/ML
TEMAZEPAM UR-MCNC: NEGATIVE NG/ML
THC UR QL: NEGATIVE NG/ML
TRAMADOL UR-MCNC: NEGATIVE NG/ML
ZOLPIDEM PHENYL-4-CARB UR CFM-MCNC: NEGATIVE NG/ML

## 2025-08-05 PROCEDURE — 90792 PSYCH DIAG EVAL W/MED SRVCS: CPT | Performed by: PSYCHIATRY & NEUROLOGY

## 2025-08-05 PROCEDURE — 1160F RVW MEDS BY RX/DR IN RCRD: CPT | Performed by: PSYCHIATRY & NEUROLOGY

## 2025-08-05 PROCEDURE — 4010F ACE/ARB THERAPY RXD/TAKEN: CPT | Performed by: PSYCHIATRY & NEUROLOGY

## 2025-08-05 PROCEDURE — 1159F MED LIST DOCD IN RCRD: CPT | Performed by: PSYCHIATRY & NEUROLOGY

## 2025-08-05 ASSESSMENT — ENCOUNTER SYMPTOMS
HYPERACTIVE: 0
APPETITE CHANGE: 0
ARTHRALGIAS: 1
NERVOUS/ANXIOUS: 0
DYSPHORIC MOOD: 1
SLEEP DISTURBANCE: 1
FATIGUE: 1
HALLUCINATIONS: 0
MYALGIAS: 1
DECREASED CONCENTRATION: 1

## 2025-08-05 NOTE — PROGRESS NOTES
"Subjective   Patient ID: Harry Ochoa is a 64 y.o. male who presents for depression     HPI  Referred by PCP   On Cymbalta 30 mg once a day for pain for 30 years or so. He had shin and foot pain at that time, helped Never been off Cymbalta all this time. He gets dizzy when he does not take. Dose was increased to 60 mg, took it for 10 days, felt edgy and \" not myself\", so decreased back to 30. That was 2 months ago  One episode when he had convulsions, was in his 20s, most likely a result of hypoglycemia, was not on any antiepileptic meds. On Insulin for 3 years, type II diabetes.   On Gabapentin for nerve pain. \"Every muscle of my body would ache like I was beat up\". He always had this pain for longtime. Higher dose of Gabapentin caused diarrhea.   Oxycodone since he had back surgery for 7 months    Most recently back surgery, then oral infection on vancomycin ended up with cardiac arrest then coma, in the hospital for 3 weeks. Resulted in mild cognitive decline following the cardiac arrest. In , he almost .     Since then, he has days when he feels his body does not allow him to do what he wants to do so gets aggravated. He is able to \" get out of that \". He denied loss of interest. He described his mood 80 % happy, 10 % not so happy and 10 % likes dooms day. He worries about his wife and children. 10-12 hours of sleep, always been this way. Has days when has initial insomnia. Drinks a lot of iced tea. Not feeling hopeless or helpless. No thoughts of suicide. Concentration is getting better after the cardiac arrest, still forgets names sometimes   Was on Wellbutrin one time for 6 months to quit smoking, did not help but did not cause side effects.   Medications Ordered Prior to Encounter[1]   Problem List[2]   Past Psychiatric History:  No past inpatient admission, no history of suicide or self harm.   No past psychiatric history   No past tx with psychotropics other than what mentioned above "   Substance Abuse History:  Rarely, no issues   Family History:  Family History[3]   None  Social History:  Social History     Socioeconomic History    Marital status:      Spouse name: Not on file    Number of children: Not on file    Years of education: Not on file    Highest education level: Not on file   Occupational History    Not on file   Tobacco Use    Smoking status: Every Day     Current packs/day: 1.00     Average packs/day: 1 pack/day for 51.0 years (51.0 ttl pk-yrs)     Types: Cigarettes     Start date: 1/1/1972    Smokeless tobacco: Never    Tobacco comments:     Smoked today   Substance and Sexual Activity    Alcohol use: Not Currently     Comment: I will have an occasional beer    Drug use: Not Currently     Types: Oxycodone    Sexual activity: Yes     Partners: Female   Other Topics Concern    Not on file   Social History Narrative    Not on file     Social Drivers of Health     Financial Resource Strain: Low Risk  (1/24/2025)    Received from [facility]    Overall Financial Resource Strain (CARDIA)     Difficulty of Paying Living Expenses: Not hard at all   Food Insecurity: No Food Insecurity (5/15/2025)    Hunger Vital Sign     Worried About Running Out of Food in the Last Year: Never true     Ran Out of Food in the Last Year: Never true   Transportation Needs: No Transportation Needs (1/24/2025)    Received from [facility]    PRAPARE - Transportation     Lack of Transportation (Medical): No     Lack of Transportation (Non-Medical): No   Physical Activity: Inactive (1/24/2025)    Received from [facility]    Exercise Vital Sign     On average, how many days per week do you engage in moderate to strenuous exercise (like a brisk walk)?: 0 days     On average, how many minutes do you engage in exercise at this level?: 0 min   Stress: No Stress Concern Present (1/24/2025)    Received from [facility]    [facility] - Occupational Stress Questionnaire     Feeling  of Stress : Not at all   Social Connections: Unknown (1/24/2025)    [facility]    Social Connection and Isolation Panel     In a typical week, how many times do you talk on the phone with family, friends, or neighbors?: More than three times a week     How often do you get together with friends or relatives?: Once a week     How often do you attend Christian or Gnosticism services?: Patient declined     Do you belong to any clubs or organizations such as Christian groups, unions, fraternal or athletic groups, or school groups?: Patient declined     How often do you attend meetings of the clubs or organizations you belong to?: Patient declined     Are you , , , , never , or living with a partner?:    Intimate Partner Violence: Not At Risk (1/24/2025)    [facility]    Humiliation, Afraid, Rape, and Kick questionnaire     Within the last year, have you been afraid of your partner or ex-partner?: No     Within the last year, have you been humiliated or emotionally abused in other ways by your partner or ex-partner?: No     Within the last year, have you been kicked, hit, slapped, or otherwise physically hurt by your partner or ex-partner?: No     Within the last year, have you been raped or forced to have any kind of sexual activity by your partner or ex-partner?: No   Housing Stability: High Risk (1/24/2025)    Received from [facility]    Housing Stability Vital Sign     In the last 12 months, was there a time when you were not able to pay the mortgage or rent on time?: Yes     In the past 12 months, how many times have you moved where you were living?: 0     At any time in the past 12 months, were you homeless or living in a shelter (including now)?: No        Born in [place], raised by his parents.   5 brothers and 3 sisters. Childhood was fun. No history of physical or sexual trauma.   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 Lives with wife.         Review of Systems   Constitutional:  Positive for fatigue. Negative for appetite change.        Can not eat big meal since his 40s.   Days with good energy and days when he can not get off the couch. Many joint replacements and surgeries    Musculoskeletal:  Positive for arthralgias and myalgias. Negative for gait problem.   Psychiatric/Behavioral:  Positive for decreased concentration, dysphoric mood and sleep disturbance. Negative for hallucinations, self-injury and suicidal ideas. The patient is not nervous/anxious and is not hyperactive.        Objective   There were no vitals filed for this visit.   Physical Exam    Psychiatric:         Attention and Perception: Attention normal.         Mood and Affect: Mood normal.         Speech: Speech normal.         Behavior: Behavior is cooperative.         Cognition and Memory: Cognition normal.         Judgment: Judgment normal.         Lab Review:   Office Visit on 07/31/2025   Component Date Value    Creatinine 07/31/2025 147.9     pH 07/31/2025 7.4     Oxidant 07/31/2025 NEGATIVE     Amphetamines 07/31/2025 NEGATIVE     Barbiturates 07/31/2025 NEGATIVE     Cocaine Metabolite 07/31/2025 NEGATIVE     Marijuana Metabolite 07/31/2025 NEGATIVE     Phencyclidine 07/31/2025 NEGATIVE     Alphahydroxyalprazolam 07/31/2025 NEGATIVE     Alphahydroxymidazolam 07/31/2025 NEGATIVE     Alphahydroxytriazolam 07/31/2025 NEGATIVE     Aminoclonazepam 07/31/2025 NEGATIVE     Hydroxyethylflurazepam 07/31/2025 NEGATIVE     Lorazepam 07/31/2025 NEGATIVE     Nordiazepam 07/31/2025 NEGATIVE     Oxazepam 07/31/2025 NEGATIVE     Temazepam 07/31/2025 NEGATIVE     Benzodiazepines Comments 07/31/2025      Fentanyl 07/31/2025 NEGATIVE     Norfentanyl 07/31/2025 NEGATIVE     Fentanyl Comments 07/31/2025      6 Acetylmorphine 07/31/2025 NEGATIVE      Heroin Metab Comments 07/31/2025      EDDP 07/31/2025 NEGATIVE     Methadone 07/31/2025 NEGATIVE     Methadone Comments 07/31/2025      Codeine 07/31/2025 NEGATIVE     Hydrocodone 07/31/2025 NEGATIVE     Hydromorphone 07/31/2025 NEGATIVE     Morphine 07/31/2025 NEGATIVE     Norhydrocodone 07/31/2025 NEGATIVE     Opiates Comments 07/31/2025      Noroxycodone 07/31/2025 1,330 (H)     Oxycodone 07/31/2025 558 (H)     Oxymorphone 07/31/2025 724 (H)     Oxycodone Comments 07/31/2025      Desmethyltramadol 07/31/2025 NEGATIVE     Tramadol 07/31/2025 NEGATIVE     Tramadol Comments 07/31/2025      Zolpidem 07/31/2025 NEGATIVE     Zolpidem Metabolite 07/31/2025 NEGATIVE     Zolpidem Comments 07/31/2025      Notes and Comments 07/31/2025     Orders Only on 06/10/2025   Component Date Value    [facility] HGB A1C 06/10/2025 6.2     [facility] Alk Phos 06/10/2025 138 (H)     [facility] Globulin 06/10/2025 3.9     [facility] Bilirubin, To* 06/10/2025 1.10     [facility] K 06/10/2025 4.5     [facility] Creatinine 06/10/2025 0.9     [facility] GFR AA 06/10/2025 >60     [facility] ALB 06/10/2025 3.6     [facility] GPT 06/10/2025 8 (L)     [facility] Calculated Os* 06/10/2025 282     [facility] Glucose 06/10/2025 96     [facility] Na 06/10/2025 142     [facility] Glomerular Fi* 06/10/2025 >60     [facility] GOT 06/10/2025 14 (L)     [facility] BUN/Creat Rat* 06/10/2025 11.1     [facility] BUN 06/10/2025 10     [facility] CO2, venous 06/10/2025 30.0     [facility] A/G Ratio 06/10/2025 0.9     [facility] Chloride 06/10/2025 104     [facility] Calcium 06/10/2025 9.9     [facility] Total Protein 06/10/2025 7.5     [facility] TSH 06/10/2025 4.20    Hospital Outpatient Visit on 04/25/2025   Component Date Value    AV pk cecilio 04/25/2025 1.19     AV mn grad 04/25/2025 3     LVOT diam 04/25/2025 1.97     MV E/A ratio 04/25/2025 0.85     Tricuspid annular plane * 04/25/2025 2.2     LV EF 04/25/2025 63     RV free wall pk S' 04/25/2025 11.00      LVIDd 04/25/2025 4.76     Aortic Valve Area by Con* 04/25/2025 2.45     Aortic Valve Area by Con* 04/25/2025 2.31     AV pk grad 04/25/2025 6     LV A4C EF 04/25/2025 49.0      Lab Results   Component Value Date     01/26/2025     07/13/2022     03/14/2022     03/13/2022    K 4.6 01/26/2025    K 4.4 07/13/2022    K 4.3 03/14/2022    K 3.4 (L) 03/13/2022    CO2 30 01/26/2025    CO2 28 07/13/2022    CO2 22 03/14/2022    CO2 22 03/13/2022    BUN 12 01/26/2025    BUN 14 07/13/2022    BUN 11 03/14/2022    BUN 14 03/13/2022    CREATININE 0.95 01/26/2025    CREATININE 1.04 07/13/2022    CREATININE 0.83 03/14/2022    CREATININE 0.92 03/13/2022    GLUCOSE 135 (H) 01/26/2025    GLUCOSE 101 (H) 07/13/2022    GLUCOSE 112 (H) 03/14/2022    GLUCOSE 128 (H) 03/13/2022    CALCIUM 10.1 01/26/2025    CALCIUM 10.1 07/13/2022    CALCIUM 8.7 03/14/2022    CALCIUM 9.5 03/13/2022     Lab Results   Component Value Date    WBC 15.6 (H) 01/26/2025    HGB 14.6 01/26/2025    HCT 45.1 01/26/2025    MCV 92 01/26/2025     (H) 01/26/2025     Lab Results   Component Value Date    CHOL 166 07/13/2022    TRIG 191 (H) 07/13/2022    HDL 53.1 07/13/2022       Assessment/Plan   Problem List Items Addressed This Visit       Adjustment disorder with mixed anxiety and depressed mood   Not meeting full criteria for major depressive episode, neither he is willing to add more meds. However, he has more risk factors for depression especially chronic muscle ache and back pain and his recent cardiac arrest.   My suggestion is to continue Cymbalta and Gabapentin as prescribed especially that higher doses of any caused side effects. Alternatives of Gabapentin might be Lyrica. Alternative of Cymbalta might be Milnacipran   Encouraged physical, mental and social activity, healthy diet, cut down on iced tea. No TV in bedroom and sleep hygiene   Discussed referral to therapy to provide techniques to cope with aggravation and mood  "reaction to chronic pain   Follow up with PCP   Follow up with me if needed       Trevor Oconnell MD        [1]   Current Outpatient Medications on File Prior to Visit   Medication Sig Dispense Refill    Accu-Chek Guide Glucose Meter misc TEST BLOOD SUGAR DAILY AS DIRECTED      Accu-Chek Guide test strips strip TESTS 3 TIMES PER DAY      Accu-Chek Softclix Lancets misc TEST BLOOD SUGAR EVERY DAY AS DIRECTED      aspirin (ASPIR-81 ORAL) Take 81 mg by mouth once daily.      atenolol (Tenormin) 25 mg tablet TAKE 1 TABLET DAILY 90 tablet 3    atorvastatin (Lipitor) 80 mg tablet TAKE 1 TABLET DAILY 90 tablet 3    BD Ultra-Fine Mini Pen Needle 31 gauge x 3/16\" needle       DULoxetine (Cymbalta) 30 mg DR capsule Take 1 capsule (30 mg) by mouth once daily. 90 capsule 3    ergocalciferol (Vitamin D-2) 1.25 MG (45440 UT) capsule Take 50,000 Int'l Units by mouth every 14 (fourteen) days.      ezetimibe (Zetia) 10 mg tablet TAKE 1 TABLET DAILY 90 tablet 3    gabapentin (Neurontin) 300 mg capsule Take 1 capsule (300 mg) by mouth 3 times a day. 90 capsule 2    insulin lispro (HumaLOG) 100 unit/mL injection Inject 6 Units under the skin once daily. Take as directed per insulin instructions.      Lantus Solostar U-100 Insulin 100 unit/mL (3 mL) pen Inject 30 Units under the skin once daily in the morning.      levothyroxine (Synthroid, Levoxyl) 125 mcg tablet TAKE 1 TABLET DAILY 90 tablet 0    omeprazole (PriLOSEC) 20 mg DR capsule TAKE 1 CAPSULE DAILY EVERY MORNING BEFORE BREAKFAST 60 capsule 5    oxyCODONE-acetaminophen (Percocet) 5-325 mg tablet Take 1 tablet by mouth 2 times a day as needed for severe pain (7 - 10). 60 tablet 0    tamsulosin (Flomax) 0.4 mg 24 hr capsule Take 1 capsule (0.4 mg) by mouth once daily. 90 capsule 3    cyanocobalamin (Vitamin B-12) 1,000 mcg tablet       losartan (Cozaar) 25 mg tablet Take 1 tablet (25 mg) by mouth once daily. (Patient not taking: Reported on 8/5/2025)      oxyCODONE (Roxicodone) 5 " mg immediate release tablet Take 1 tablet (5 mg) by mouth every 6 hours if needed.      [DISCONTINUED] oxyCODONE-acetaminophen (Percocet) 5-325 mg tablet Take 1 tablet by mouth 2 times a day as needed for severe pain (7 - 10). 60 tablet 0     No current facility-administered medications on file prior to visit.   [2]   Patient Active Problem List  Diagnosis    Hypothyroidism    Benign hypertension    Chest pain    Chronic fatigue    Fatigue    Chronic pancreatitis (Multi)    Arteriosclerosis of coronary artery    Hypercholesterolemia    Mass of pancreas (HHS-HCC)    Type 2 diabetes mellitus with other specified complication, unspecified whether long term insulin use (Multi)    Cobalamin deficiency    Vitamin D deficiency    Wheezing    Tobacco abuse    Preoperative clearance    Elevated CA 19-9 level    Failed back surgical syndrome    Cerebrovascular accident (CVA) (Multi)    Cardiopulmonary arrest (Multi)    Adjustment disorder with mixed anxiety and depressed mood   [3]   Family History  Problem Relation Name Age of Onset    Lymphoma Father [place]     Arthritis Sister [place] 20 - 29    Heart attack Brother Christiano     Obesity Brother Scotty     Diabetes Brother Mayito     Heart attack Brother Mayito     No Known Problems Brother Vincent     Peripheral vascular disease Brother Marques     Heart attack Brother Christiano     Angina Brother Christiano     Diabetes type II Brother [place] brother     Obesity Brother Scotty brother

## 2025-08-11 ENCOUNTER — TELEPHONE (OUTPATIENT)
Dept: PAIN MEDICINE | Facility: CLINIC | Age: 65
End: 2025-08-11
Payer: MEDICARE

## 2025-08-15 ENCOUNTER — APPOINTMENT (OUTPATIENT)
Dept: INTEGRATIVE MEDICINE | Facility: CLINIC | Age: 65
End: 2025-08-15
Payer: MEDICARE

## 2025-08-15 DIAGNOSIS — I10 BENIGN HYPERTENSION: ICD-10-CM

## 2025-08-15 DIAGNOSIS — G89.29 CHRONIC PRIMARY LOW BACK PAIN: Primary | ICD-10-CM

## 2025-08-15 DIAGNOSIS — M54.59 CHRONIC PRIMARY LOW BACK PAIN: Primary | ICD-10-CM

## 2025-08-15 PROCEDURE — 97811 ACUP 1/> W/O ESTIM EA ADD 15: CPT

## 2025-08-15 PROCEDURE — 97810 ACUP 1/> WO ESTIM 1ST 15 MIN: CPT

## 2025-08-15 PROCEDURE — 99202 OFFICE O/P NEW SF 15 MIN: CPT

## 2025-08-15 RX ORDER — ATENOLOL 25 MG/1
25 TABLET ORAL DAILY
Qty: 90 TABLET | Refills: 3 | Status: SHIPPED | OUTPATIENT
Start: 2025-08-15

## 2025-08-15 SDOH — ECONOMIC STABILITY: FOOD INSECURITY: WITHIN THE PAST 12 MONTHS, THE FOOD YOU BOUGHT JUST DIDN'T LAST AND YOU DIDN'T HAVE MONEY TO GET MORE.: NEVER TRUE

## 2025-08-15 SDOH — SOCIAL STABILITY: SOCIAL NETWORK: PROMIS ABILITY TO PARTICIPATE IN SOCIAL ROLES & ACTIVITIES T-SCORE: 45

## 2025-08-15 SDOH — ECONOMIC STABILITY: FOOD INSECURITY: WITHIN THE PAST 12 MONTHS, YOU WORRIED THAT YOUR FOOD WOULD RUN OUT BEFORE YOU GOT MONEY TO BUY MORE.: NEVER TRUE

## 2025-08-15 SDOH — SOCIAL STABILITY: SOCIAL NETWORK

## 2025-08-15 SDOH — SOCIAL STABILITY: SOCIAL NETWORK: I HAVE TROUBLE DOING ALL OF THE FAMILY ACTIVITIES THAT I WANT TO DO: SOMETIMES

## 2025-08-15 SDOH — SOCIAL STABILITY: SOCIAL NETWORK: I HAVE TROUBLE DOING ALL OF MY USUAL WORK (INCLUDE WORK AT HOME): SOMETIMES

## 2025-08-15 SDOH — SOCIAL STABILITY: SOCIAL NETWORK: I HAVE TROUBLE DOING ALL OF THE ACTIVITIES WITH FRIENDS THAT I WANT TO DO: SOMETIMES

## 2025-08-15 SDOH — SOCIAL STABILITY: SOCIAL NETWORK: I HAVE TROUBLE DOING ALL OF MY REGULAR LEISURE ACTIVITIES WITH OTHERS: SOMETIMES

## 2025-08-15 ASSESSMENT — PROMIS GLOBAL HEALTH SCALE
RATE_AVERAGE_PAIN: 7
RATE_AVERAGE_FATIGUE: SEVERE
RATE_SOCIAL_SATISFACTION: VERY GOOD
RATE_MENTAL_HEALTH: VERY GOOD
RATE_GENERAL_HEALTH: FAIR
EMOTIONAL_PROBLEMS: RARELY
CARRYOUT_SOCIAL_ACTIVITIES: VERY GOOD
RATE_PHYSICAL_HEALTH: POOR
RATE_QUALITY_OF_LIFE: GOOD
CARRYOUT_PHYSICAL_ACTIVITIES: MODERATELY

## 2025-08-15 ASSESSMENT — ANXIETY QUESTIONNAIRES
PAST MONTH HOW OFTEN HAVE YOU FELT DIFFICULTIES WERE PILING UP SO HIGH THAT YOU COULD NOT OVERCOME THEM: 0 - NEVER
PAST MONTH HOW OFTEN HAVE YOU FELT THAT YOU WERE UNABLE TO CONTROL THE IMPORTANT THINGS IN YOUR LIFE: 0 - NEVER
PAST MONTH HOW OFTEN HAVE YOU FELT THAT YOU WERE UNABLE TO CONTROL THE IMPORTANT THINGS IN YOUR LIFE: 0 - NEVER
PAST MONTH HOW OFTEN HAVE YOU FELT CONFIDENT ABOUT YOUR ABILITY TO HANDLE YOUR PROBLEMS: 4 - VERY OFTEN
PAST MONTH HOW OFTEN HAVE YOU FELT THAT THINGS WERE GOING YOUR WAY: 1 - ALMOST NEVER
PAST MONTH HOW OFTEN HAVE YOU FELT CONFIDENT ABOUT YOUR ABILITY TO HANDLE YOUR PROBLEMS: 4 - VERY OFTEN

## 2025-08-21 ENCOUNTER — OFFICE VISIT (OUTPATIENT)
Dept: PAIN MEDICINE | Facility: CLINIC | Age: 65
End: 2025-08-21
Payer: MEDICARE

## 2025-08-21 VITALS
HEIGHT: 68 IN | WEIGHT: 192 LBS | OXYGEN SATURATION: 98 % | BODY MASS INDEX: 29.1 KG/M2 | TEMPERATURE: 97.7 F | SYSTOLIC BLOOD PRESSURE: 162 MMHG | DIASTOLIC BLOOD PRESSURE: 78 MMHG | RESPIRATION RATE: 16 BRPM | HEART RATE: 51 BPM

## 2025-08-21 DIAGNOSIS — M47.22 CERVICAL SPONDYLOSIS WITH RADICULOPATHY: ICD-10-CM

## 2025-08-21 DIAGNOSIS — Z79.899 MEDICATION MANAGEMENT: Primary | ICD-10-CM

## 2025-08-21 DIAGNOSIS — E11.69 TYPE 2 DIABETES MELLITUS WITH OTHER SPECIFIED COMPLICATION, UNSPECIFIED WHETHER LONG TERM INSULIN USE (MULTI): ICD-10-CM

## 2025-08-21 DIAGNOSIS — K21.9 GASTROESOPHAGEAL REFLUX DISEASE, UNSPECIFIED WHETHER ESOPHAGITIS PRESENT: ICD-10-CM

## 2025-08-21 DIAGNOSIS — M96.1 POSTLAMINECTOMY SYNDROME OF LUMBAR REGION: ICD-10-CM

## 2025-08-21 DIAGNOSIS — G89.4 CHRONIC PAIN SYNDROME: ICD-10-CM

## 2025-08-21 PROCEDURE — 3077F SYST BP >= 140 MM HG: CPT | Performed by: ANESTHESIOLOGY

## 2025-08-21 PROCEDURE — 99214 OFFICE O/P EST MOD 30 MIN: CPT | Performed by: ANESTHESIOLOGY

## 2025-08-21 PROCEDURE — 3008F BODY MASS INDEX DOCD: CPT | Performed by: ANESTHESIOLOGY

## 2025-08-21 PROCEDURE — 99213 OFFICE O/P EST LOW 20 MIN: CPT

## 2025-08-21 PROCEDURE — 4010F ACE/ARB THERAPY RXD/TAKEN: CPT | Performed by: ANESTHESIOLOGY

## 2025-08-21 PROCEDURE — 3078F DIAST BP <80 MM HG: CPT | Performed by: ANESTHESIOLOGY

## 2025-08-21 PROCEDURE — 1125F AMNT PAIN NOTED PAIN PRSNT: CPT | Performed by: ANESTHESIOLOGY

## 2025-08-21 RX ORDER — OXYCODONE AND ACETAMINOPHEN 5; 325 MG/1; MG/1
1 TABLET ORAL 2 TIMES DAILY PRN
Qty: 60 TABLET | Refills: 0 | Status: SHIPPED | OUTPATIENT
Start: 2025-08-21 | End: 2025-09-20

## 2025-08-21 RX ORDER — PERPHENAZINE 16 MG
600 TABLET ORAL
Qty: 90 CAPSULE | Refills: 11 | Status: SHIPPED | OUTPATIENT
Start: 2025-08-21

## 2025-08-21 RX ORDER — NALOXONE HYDROCHLORIDE 4 MG/.1ML
1 SPRAY NASAL AS NEEDED
Qty: 2 EACH | Refills: 0 | Status: SHIPPED | OUTPATIENT
Start: 2025-08-21

## 2025-08-21 RX ORDER — LORAZEPAM 2 MG/1
TABLET ORAL
Qty: 1 TABLET | Refills: 0 | Status: SHIPPED | OUTPATIENT
Start: 2025-08-21

## 2025-08-21 RX ORDER — VITAMIN B COMPLEX
1 CAPSULE ORAL 2 TIMES DAILY
Qty: 60 CAPSULE | Refills: 11 | Status: SHIPPED | OUTPATIENT
Start: 2025-08-21 | End: 2026-08-21

## 2025-08-21 RX ORDER — ACETAMINOPHEN 160 MG/5ML
200 SUSPENSION, ORAL (FINAL DOSE FORM) ORAL
Qty: 90 CAPSULE | Refills: 11 | Status: SHIPPED | OUTPATIENT
Start: 2025-08-21 | End: 2026-08-21

## 2025-08-21 ASSESSMENT — PAIN SCALES - GENERAL
PAINLEVEL_OUTOF10: 7
PAINLEVEL_OUTOF10: 7

## 2025-08-21 ASSESSMENT — PAIN DESCRIPTION - DESCRIPTORS: DESCRIPTORS: ACHING;BURNING;TINGLING

## 2025-08-21 ASSESSMENT — ENCOUNTER SYMPTOMS
DEPRESSION: 0
LOSS OF SENSATION IN FEET: 0
OCCASIONAL FEELINGS OF UNSTEADINESS: 0

## 2025-08-21 ASSESSMENT — PAIN - FUNCTIONAL ASSESSMENT: PAIN_FUNCTIONAL_ASSESSMENT: 0-10

## 2025-08-22 SDOH — ECONOMIC STABILITY: FOOD INSECURITY: WITHIN THE PAST 12 MONTHS, THE FOOD YOU BOUGHT JUST DIDN'T LAST AND YOU DIDN'T HAVE MONEY TO GET MORE.: NEVER TRUE

## 2025-08-22 SDOH — SOCIAL STABILITY: SOCIAL NETWORK: I HAVE TROUBLE DOING ALL OF THE ACTIVITIES WITH FRIENDS THAT I WANT TO DO: RARELY

## 2025-08-22 SDOH — SOCIAL STABILITY: SOCIAL NETWORK: I HAVE TROUBLE DOING ALL OF MY REGULAR LEISURE ACTIVITIES WITH OTHERS: SOMETIMES

## 2025-08-22 SDOH — SOCIAL STABILITY: SOCIAL NETWORK

## 2025-08-22 SDOH — SOCIAL STABILITY: SOCIAL NETWORK: I HAVE TROUBLE DOING ALL OF MY USUAL WORK (INCLUDE WORK AT HOME): RARELY

## 2025-08-22 SDOH — ECONOMIC STABILITY: FOOD INSECURITY: WITHIN THE PAST 12 MONTHS, YOU WORRIED THAT YOUR FOOD WOULD RUN OUT BEFORE YOU GOT MONEY TO BUY MORE.: NEVER TRUE

## 2025-08-22 SDOH — SOCIAL STABILITY: SOCIAL NETWORK: PROMIS ABILITY TO PARTICIPATE IN SOCIAL ROLES & ACTIVITIES T-SCORE: 50

## 2025-08-22 SDOH — SOCIAL STABILITY: SOCIAL NETWORK: I HAVE TROUBLE DOING ALL OF THE FAMILY ACTIVITIES THAT I WANT TO DO: RARELY

## 2025-08-22 ASSESSMENT — ANXIETY QUESTIONNAIRES
PAST MONTH HOW OFTEN HAVE YOU FELT CONFIDENT ABOUT YOUR ABILITY TO HANDLE YOUR PROBLEMS: 4 - VERY OFTEN
PAST MONTH HOW OFTEN HAVE YOU FELT CONFIDENT ABOUT YOUR ABILITY TO HANDLE YOUR PROBLEMS: 4 - VERY OFTEN
PAST MONTH HOW OFTEN HAVE YOU FELT THAT YOU WERE UNABLE TO CONTROL THE IMPORTANT THINGS IN YOUR LIFE: 0 - NEVER
PAST MONTH HOW OFTEN HAVE YOU FELT THAT YOU WERE UNABLE TO CONTROL THE IMPORTANT THINGS IN YOUR LIFE: 0 - NEVER
PAST MONTH HOW OFTEN HAVE YOU FELT DIFFICULTIES WERE PILING UP SO HIGH THAT YOU COULD NOT OVERCOME THEM: 1 - ALMOST NEVER
PAST MONTH HOW OFTEN HAVE YOU FELT THAT THINGS WERE GOING YOUR WAY: 1 - ALMOST NEVER

## 2025-08-22 ASSESSMENT — PROMIS GLOBAL HEALTH SCALE
EMOTIONAL_PROBLEMS: RARELY
RATE_PHYSICAL_HEALTH: POOR
RATE_GENERAL_HEALTH: FAIR
RATE_AVERAGE_FATIGUE: SEVERE
CARRYOUT_SOCIAL_ACTIVITIES: VERY GOOD
RATE_SOCIAL_SATISFACTION: GOOD
RATE_AVERAGE_PAIN: 7
CARRYOUT_PHYSICAL_ACTIVITIES: MODERATELY
RATE_MENTAL_HEALTH: GOOD
RATE_QUALITY_OF_LIFE: GOOD

## 2025-08-24 LAB
1OH-MIDAZOLAM UR-MCNC: ABNORMAL NG/ML
7AMINOCLONAZEPAM UR-MCNC: ABNORMAL NG/ML
A-OH ALPRAZ UR-MCNC: ABNORMAL NG/ML
A-OH-TRIAZOLAM UR-MCNC: ABNORMAL NG/ML
AMPHETAMINES UR QL: NEGATIVE NG/ML
BARBITURATES UR QL: NEGATIVE NG/ML
BZE UR QL: NEGATIVE NG/ML
CODEINE UR-MCNC: NEGATIVE NG/ML
CREAT UR-MCNC: 61.6 MG/DL
DRUG SCREEN COMMENT UR-IMP: ABNORMAL
EDDP UR-MCNC: ABNORMAL NG/ML
FENTANYL UR-MCNC: ABNORMAL NG/ML
HYDROCODONE UR-MCNC: NEGATIVE NG/ML
HYDROMORPHONE UR-MCNC: NEGATIVE NG/ML
LORAZEPAM UR-MCNC: ABNORMAL NG/ML
METHADONE UR-MCNC: ABNORMAL UG/L
MORPHINE UR-MCNC: NEGATIVE NG/ML
NORDIAZEPAM UR-MCNC: ABNORMAL NG/ML
NORFENTANYL UR-MCNC: ABNORMAL NG/ML
NORHYDROCODONE UR CFM-MCNC: NEGATIVE NG/ML
NOROXYCODONE UR CFM-MCNC: 653 NG/ML
NORTRAMADOL UR-MCNC: NEGATIVE NG/ML
OH-ETHYLFLURAZ UR-MCNC: ABNORMAL NG/ML
OXAZEPAM UR-MCNC: ABNORMAL UG/ML
OXIDANTS UR QL: NEGATIVE MCG/ML
OXYCODONE UR CFM-MCNC: 316 NG/ML
OXYMORPHONE UR CFM-MCNC: 442 NG/ML
PCP UR QL: NEGATIVE NG/ML
PH UR: 7.3 [PH] (ref 4.5–9)
QUEST 6 ACETYLMORPHINE: ABNORMAL
QUEST NOTES AND COMMENTS: ABNORMAL
QUEST PATIENT HISTORICAL REPORT: ABNORMAL
QUEST ZOLPIDEM: ABNORMAL
TEMAZEPAM UR-MCNC: ABNORMAL NG/ML
THC UR QL: NEGATIVE NG/ML
TRAMADOL UR-MCNC: NEGATIVE NG/ML
ZOLPIDEM PHENYL-4-CARB UR CFM-MCNC: ABNORMAL NG/ML

## 2025-08-25 ENCOUNTER — APPOINTMENT (OUTPATIENT)
Dept: INTEGRATIVE MEDICINE | Facility: CLINIC | Age: 65
End: 2025-08-25
Payer: MEDICARE

## 2025-08-25 DIAGNOSIS — M54.59 OTHER LOW BACK PAIN: Primary | ICD-10-CM

## 2025-08-25 PROCEDURE — 97810 ACUP 1/> WO ESTIM 1ST 15 MIN: CPT | Performed by: ACUPUNCTURIST

## 2025-08-25 PROCEDURE — 97811 ACUP 1/> W/O ESTIM EA ADD 15: CPT | Performed by: ACUPUNCTURIST

## 2025-08-25 ASSESSMENT — ENCOUNTER SYMPTOMS: BACK PAIN: 1

## 2025-08-27 ENCOUNTER — APPOINTMENT (OUTPATIENT)
Dept: INTEGRATIVE MEDICINE | Facility: CLINIC | Age: 65
End: 2025-08-27
Payer: MEDICARE

## 2025-08-27 DIAGNOSIS — F17.200 NICOTINE DEPENDENCE WITH CURRENT USE: Primary | ICD-10-CM

## 2025-08-27 DIAGNOSIS — E55.9 VITAMIN D DEFICIENCY: ICD-10-CM

## 2025-08-27 DIAGNOSIS — M25.50 POLYARTHRALGIA: ICD-10-CM

## 2025-08-27 LAB
1OH-MIDAZOLAM UR-MCNC: NEGATIVE NG/ML
7AMINOCLONAZEPAM UR-MCNC: NEGATIVE NG/ML
A-OH ALPRAZ UR-MCNC: NEGATIVE NG/ML
A-OH-TRIAZOLAM UR-MCNC: NEGATIVE NG/ML
AMPHETAMINES UR QL: NEGATIVE NG/ML
BARBITURATES UR QL: NEGATIVE NG/ML
BZE UR QL: NEGATIVE NG/ML
CODEINE UR-MCNC: NEGATIVE NG/ML
CREAT UR-MCNC: 61.6 MG/DL
DRUG SCREEN COMMENT UR-IMP: ABNORMAL
EDDP UR-MCNC: NEGATIVE NG/ML
FENTANYL UR-MCNC: NEGATIVE NG/ML
HYDROCODONE UR-MCNC: NEGATIVE NG/ML
HYDROMORPHONE UR-MCNC: NEGATIVE NG/ML
LORAZEPAM UR-MCNC: NEGATIVE NG/ML
METHADONE UR-MCNC: NEGATIVE NG/ML
MORPHINE UR-MCNC: NEGATIVE NG/ML
NORDIAZEPAM UR-MCNC: NEGATIVE NG/ML
NORFENTANYL UR-MCNC: NEGATIVE NG/ML
NORHYDROCODONE UR CFM-MCNC: NEGATIVE NG/ML
NOROXYCODONE UR CFM-MCNC: 653 NG/ML
NORTRAMADOL UR-MCNC: NEGATIVE NG/ML
OH-ETHYLFLURAZ UR-MCNC: NEGATIVE NG/ML
OXAZEPAM UR-MCNC: NEGATIVE NG/ML
OXIDANTS UR QL: NEGATIVE MCG/ML
OXYCODONE UR CFM-MCNC: 316 NG/ML
OXYMORPHONE UR CFM-MCNC: 442 NG/ML
PCP UR QL: NEGATIVE NG/ML
PH UR: 7.3 [PH] (ref 4.5–9)
QUEST 6 ACETYLMORPHINE: NEGATIVE NG/ML
QUEST NOTES AND COMMENTS: ABNORMAL
QUEST ZOLPIDEM: NEGATIVE NG/ML
TEMAZEPAM UR-MCNC: NEGATIVE NG/ML
THC UR QL: NEGATIVE NG/ML
TRAMADOL UR-MCNC: NEGATIVE NG/ML
ZOLPIDEM PHENYL-4-CARB UR CFM-MCNC: NEGATIVE NG/ML

## 2025-08-27 PROCEDURE — 99205 OFFICE O/P NEW HI 60 MIN: CPT

## 2025-08-27 PROCEDURE — 99407 BEHAV CHNG SMOKING > 10 MIN: CPT

## 2025-08-28 ASSESSMENT — ENCOUNTER SYMPTOMS
FATIGUE: 1
MYALGIAS: 1
ARTHRALGIAS: 1
BACK PAIN: 1

## 2025-09-05 ENCOUNTER — TELEPHONE (OUTPATIENT)
Dept: PAIN MEDICINE | Facility: CLINIC | Age: 65
End: 2025-09-05
Payer: MEDICARE

## 2025-09-12 ENCOUNTER — APPOINTMENT (OUTPATIENT)
Dept: INTEGRATIVE MEDICINE | Facility: CLINIC | Age: 65
End: 2025-09-12
Payer: MEDICARE

## 2025-09-19 ENCOUNTER — APPOINTMENT (OUTPATIENT)
Dept: INTEGRATIVE MEDICINE | Facility: CLINIC | Age: 65
End: 2025-09-19
Payer: MEDICARE

## 2025-09-23 ENCOUNTER — APPOINTMENT (OUTPATIENT)
Dept: INTEGRATIVE MEDICINE | Facility: CLINIC | Age: 65
End: 2025-09-23
Payer: MEDICARE

## 2025-10-03 ENCOUNTER — APPOINTMENT (OUTPATIENT)
Dept: INTEGRATIVE MEDICINE | Facility: CLINIC | Age: 65
End: 2025-10-03
Payer: MEDICARE

## 2025-10-08 ENCOUNTER — APPOINTMENT (OUTPATIENT)
Dept: INTEGRATIVE MEDICINE | Facility: CLINIC | Age: 65
End: 2025-10-08
Payer: MEDICARE

## 2025-10-30 ENCOUNTER — APPOINTMENT (OUTPATIENT)
Dept: PAIN MEDICINE | Facility: CLINIC | Age: 65
End: 2025-10-30
Payer: MEDICARE

## 2025-11-12 ENCOUNTER — APPOINTMENT (OUTPATIENT)
Dept: INTEGRATIVE MEDICINE | Facility: CLINIC | Age: 65
End: 2025-11-12
Payer: MEDICARE

## 2026-02-23 ENCOUNTER — APPOINTMENT (OUTPATIENT)
Age: 66
End: 2026-02-23
Payer: MEDICARE